# Patient Record
Sex: MALE | Race: WHITE | NOT HISPANIC OR LATINO | Employment: FULL TIME | URBAN - METROPOLITAN AREA
[De-identification: names, ages, dates, MRNs, and addresses within clinical notes are randomized per-mention and may not be internally consistent; named-entity substitution may affect disease eponyms.]

---

## 2017-08-15 ENCOUNTER — TELEPHONE (OUTPATIENT)
Dept: SPINE | Facility: CLINIC | Age: 42
End: 2017-08-15

## 2017-08-15 DIAGNOSIS — M54.50 LUMBAR SPINE PAIN: Primary | ICD-10-CM

## 2017-08-18 ENCOUNTER — TELEPHONE (OUTPATIENT)
Dept: ORTHOPEDICS | Facility: CLINIC | Age: 42
End: 2017-08-18

## 2017-08-18 NOTE — TELEPHONE ENCOUNTER
Called patient several times and both numbers in chart are invalid. Patient is not active on [facility].

## 2017-10-19 DIAGNOSIS — H54.62 VISION LOSS OF LEFT EYE: Primary | ICD-10-CM

## 2017-10-27 ENCOUNTER — HOSPITAL ENCOUNTER (OUTPATIENT)
Dept: RADIOLOGY | Facility: HOSPITAL | Age: 42
Discharge: HOME | End: 2017-10-27
Attending: INTERNAL MEDICINE
Payer: COMMERCIAL

## 2017-10-27 DIAGNOSIS — H54.62 VISION LOSS OF LEFT EYE: ICD-10-CM

## 2017-10-27 PROCEDURE — 93880 EXTRACRANIAL BILAT STUDY: CPT | Mod: TC

## 2017-10-27 PROCEDURE — 93880 EXTRACRANIAL BILAT STUDY: CPT | Mod: 26,,, | Performed by: RADIOLOGY

## 2017-11-02 DIAGNOSIS — H54.62 VISION LOSS OF LEFT EYE: Primary | ICD-10-CM

## 2017-11-14 ENCOUNTER — HOSPITAL ENCOUNTER (OUTPATIENT)
Dept: RADIOLOGY | Facility: HOSPITAL | Age: 42
Discharge: HOME | End: 2017-11-14
Attending: INTERNAL MEDICINE
Payer: COMMERCIAL

## 2017-11-14 DIAGNOSIS — H54.62 VISION LOSS OF LEFT EYE: ICD-10-CM

## 2017-11-14 PROCEDURE — 70553 MRI BRAIN STEM W/O & W/DYE: CPT | Mod: 26,,, | Performed by: RADIOLOGY

## 2017-11-14 PROCEDURE — 70553 MRI BRAIN STEM W/O & W/DYE: CPT | Mod: TC

## 2017-11-14 PROCEDURE — A9585 GADOBUTROL INJECTION: HCPCS | Performed by: INTERNAL MEDICINE

## 2017-11-14 PROCEDURE — 25500020 PHARM REV CODE 255: Performed by: INTERNAL MEDICINE

## 2017-11-14 RX ORDER — GADOBUTROL 604.72 MG/ML
10 INJECTION INTRAVENOUS
Status: COMPLETED | OUTPATIENT
Start: 2017-11-14 | End: 2017-11-14

## 2017-11-14 RX ADMIN — GADOBUTROL 10 ML: 604.72 INJECTION INTRAVENOUS at 09:11

## 2019-03-20 ENCOUNTER — HOSPITAL ENCOUNTER (OUTPATIENT)
Dept: RADIOLOGY | Facility: HOSPITAL | Age: 44
Discharge: HOME | End: 2019-03-20
Attending: INTERNAL MEDICINE
Payer: COMMERCIAL

## 2019-03-20 DIAGNOSIS — M53.3 COCCYDYNIA: Primary | ICD-10-CM

## 2019-03-20 DIAGNOSIS — M53.3 COCCYDYNIA: ICD-10-CM

## 2019-03-20 PROCEDURE — 72100 X-RAY EXAM L-S SPINE 2/3 VWS: CPT | Mod: TC,FY

## 2019-03-20 PROCEDURE — 72100 X-RAY EXAM L-S SPINE 2/3 VWS: CPT | Mod: 26,,, | Performed by: RADIOLOGY

## 2019-03-20 PROCEDURE — 72100 XR LUMBAR SPINE AP AND LATERAL: ICD-10-PCS | Mod: 26,,, | Performed by: RADIOLOGY

## 2019-03-20 PROCEDURE — 72220 XR SACRUM AND COCCYX: ICD-10-PCS | Mod: 26,,, | Performed by: RADIOLOGY

## 2019-03-20 PROCEDURE — 72220 X-RAY EXAM SACRUM TAILBONE: CPT | Mod: 26,,, | Performed by: RADIOLOGY

## 2019-03-20 PROCEDURE — 72220 X-RAY EXAM SACRUM TAILBONE: CPT | Mod: TC,FY

## 2019-07-31 ENCOUNTER — OFFICE VISIT (OUTPATIENT)
Dept: URGENT CARE | Facility: CLINIC | Age: 44
End: 2019-07-31
Payer: COMMERCIAL

## 2019-07-31 VITALS
SYSTOLIC BLOOD PRESSURE: 132 MMHG | WEIGHT: 265 LBS | TEMPERATURE: 97 F | HEIGHT: 74 IN | DIASTOLIC BLOOD PRESSURE: 88 MMHG | OXYGEN SATURATION: 97 % | HEART RATE: 80 BPM | BODY MASS INDEX: 34.01 KG/M2

## 2019-07-31 DIAGNOSIS — J32.9 SINUSITIS, UNSPECIFIED CHRONICITY, UNSPECIFIED LOCATION: Primary | ICD-10-CM

## 2019-07-31 LAB
CTP QC/QA: YES
S PYO RRNA THROAT QL PROBE: NEGATIVE

## 2019-07-31 PROCEDURE — 96372 PR INJECTION,THERAP/PROPH/DIAG2ST, IM OR SUBCUT: ICD-10-PCS | Mod: S$GLB,,, | Performed by: NURSE PRACTITIONER

## 2019-07-31 PROCEDURE — 87880 POCT RAPID STREP A: ICD-10-PCS | Mod: QW,S$GLB,, | Performed by: NURSE PRACTITIONER

## 2019-07-31 PROCEDURE — 3008F PR BODY MASS INDEX (BMI) DOCUMENTED: ICD-10-PCS | Mod: CPTII,S$GLB,, | Performed by: NURSE PRACTITIONER

## 2019-07-31 PROCEDURE — 87880 STREP A ASSAY W/OPTIC: CPT | Mod: QW,S$GLB,, | Performed by: NURSE PRACTITIONER

## 2019-07-31 PROCEDURE — 96372 THER/PROPH/DIAG INJ SC/IM: CPT | Mod: S$GLB,,, | Performed by: NURSE PRACTITIONER

## 2019-07-31 PROCEDURE — 99203 PR OFFICE/OUTPT VISIT, NEW, LEVL III, 30-44 MIN: ICD-10-PCS | Mod: 25,S$GLB,, | Performed by: NURSE PRACTITIONER

## 2019-07-31 PROCEDURE — 99203 OFFICE O/P NEW LOW 30 MIN: CPT | Mod: 25,S$GLB,, | Performed by: NURSE PRACTITIONER

## 2019-07-31 PROCEDURE — 3008F BODY MASS INDEX DOCD: CPT | Mod: CPTII,S$GLB,, | Performed by: NURSE PRACTITIONER

## 2019-07-31 RX ORDER — AMOXICILLIN AND CLAVULANATE POTASSIUM 875; 125 MG/1; MG/1
1 TABLET, FILM COATED ORAL 2 TIMES DAILY
Qty: 14 TABLET | Refills: 0 | Status: SHIPPED | OUTPATIENT
Start: 2019-07-31 | End: 2019-08-07

## 2019-07-31 RX ORDER — HYDROCODONE BITARTRATE AND ACETAMINOPHEN 5; 325 MG/1; MG/1
TABLET ORAL
COMMUNITY
End: 2021-10-14

## 2019-07-31 RX ORDER — LISINOPRIL 40 MG/1
TABLET ORAL
COMMUNITY
End: 2020-11-20

## 2019-07-31 RX ORDER — DICLOFENAC SODIUM 75 MG/1
TABLET, DELAYED RELEASE ORAL
COMMUNITY
End: 2021-10-14

## 2019-07-31 RX ORDER — DEXAMETHASONE SODIUM PHOSPHATE 100 MG/10ML
6 INJECTION INTRAMUSCULAR; INTRAVENOUS
Status: COMPLETED | OUTPATIENT
Start: 2019-07-31 | End: 2019-07-31

## 2019-07-31 RX ADMIN — DEXAMETHASONE SODIUM PHOSPHATE 6 MG: 100 INJECTION INTRAMUSCULAR; INTRAVENOUS at 12:07

## 2019-07-31 NOTE — PATIENT INSTRUCTIONS
"Please follow up with your Primary care provider within 2-5 days if your signs and symptoms have not resolved or worsen.  The usual course of cold symptoms are 10-14 days.     If your condition worsens or fails to improve we recommend that you receive another evaluation at the emergency room immediately or contact your primary medical clinic to discuss your concerns.     You must understand that you have received an Urgent Care treatment only and that you may be released before all of your medical problems are known or treated.   You, the patient, will arrange for follow up care as instructed.     Tylenol or Ibuprofen can also be used as directed for pain/fever unless you have an allergy to them or medical condition such as stomach ulcers, kidney or liver disease or blood thinners etc for which you should not be taking these type of medications.     Take over the counter cough medication as directed as needed for cough.  You should avoid medications with pseudoephedrine or phenylephrine (any medication with "D") if you have high blood pressure as this can cause an elevation in your blood pressure. Instead consider Corcidin HBP as needed to prevent an elevated blood pressure.     Natural remedies of symptoms (as needed) include humidification, saline nasal sprays, and/or steamy showers.  Increase fluids, warm tea with honey, cough drops as needed.  You may also use salt water gargles for sore throat.    IF you received a steroid shot today - As discussed, this can elevate your blood pressure, elevate your blood sugar, water weight gain, nervous energy, redness to the face and dimpling of the skin at the injection site.       You have been given Augmentin for an infection today.  Please take all the antibiotic as prescribed on the bottle.      Augmentin is hard on the stomach and should always be taken with food.      Augmentin can cause diarrhea.  As with any antibiotics, use probiotics and/or high culture yogurt " about 2 hours apart from the antibiotic and about 1 week after the antibiotic to replace the gut alexandr lost with antibiotic use.      If you are female and on BCP use additional methods to prevent pregnancy while on antibiotics and for one cycle after.       Sinusitis (Antibiotic Treatment)    The sinuses are air-filled spaces within the bones of the face. They connect to the inside of the nose. Sinusitis is an inflammation of the tissue lining the sinus cavity. Sinus inflammation can occur during a cold. It can also be due to allergies to pollens and other particles in the air. Sinusitis can cause symptoms of sinus congestion and fullness. A sinus infection causes fever, headache and facial pain. There is often green or yellow drainage from the nose or into the back of the throat (post-nasal drip). You have been given antibiotics to treat this condition.  Home care:  · Take the full course of antibiotics as instructed. Do not stop taking them, even if you feel better.  · Drink plenty of water, hot tea, and other liquids. This may help thin mucus. It also may promote sinus drainage.  · Heat may help soothe painful areas of the face. Use a towel soaked in hot water. Or,  the shower and direct the hot spray onto your face. Using a vaporizer along with a menthol rub at night may also help.   · An expectorant containing guaifenesin may help thin the mucus and promote drainage from the sinuses.  · Over-the-counter decongestants may be used unless a similar medicine was prescribed. Nasal sprays work the fastest. Use one that contains phenylephrine or oxymetazoline. First blow the nose gently. Then use the spray. Do not use these medicines more often than directed on the label or symptoms may get worse. You may also use tablets containing pseudoephedrine. Avoid products that combine ingredients, because side effects may be increased. Read labels. You can also ask the pharmacist for help. (NOTE: Persons with high  blood pressure should not use decongestants. They can raise blood pressure.)  · Over-the-counter antihistamines may help if allergies contributed to your sinusitis.    · Do not use nasal rinses or irrigation during an acute sinus infection, unless told to by your health care provider. Rinsing may spread the infection to other sinuses.  · Use acetaminophen or ibuprofen to control pain, unless another pain medicine was prescribed. (If you have chronic liver or kidney disease or ever had a stomach ulcer, talk with your doctor before using these medicines. Aspirin should never be used in anyone under 18 years of age who is ill with a fever. It may cause severe liver damage.)  · Don't smoke. This can worsen symptoms.  Follow-up care  Follow up with your healthcare provider or our staff if you are not improving within the next week.  When to seek medical advice  Call your healthcare provider if any of these occur:  · Facial pain or headache becoming more severe  · Stiff neck  · Unusual drowsiness or confusion  · Swelling of the forehead or eyelids  · Vision problems, including blurred or double vision  · Fever of 100.4ºF (38ºC) or higher, or as directed by your healthcare provider  · Seizure  · Breathing problems  · Symptoms not resolving within 10 days  Date Last Reviewed: 4/13/2015  © 7464-9649 [employer]. [address] [place] [address], [place] [address]. All rights reserved. This information is not intended as a substitute for professional medical care. Always follow your healthcare professional's instructions.

## 2019-07-31 NOTE — PROGRESS NOTES
"Subjective:       Patient ID: Jean Martinez is a 44 y.o. male.    Vitals:  height is 6' 2" (1.88 m) and weight is 120.2 kg (265 lb). His oral temperature is 97.3 °F (36.3 °C). His blood pressure is 132/88 and his pulse is 80. His oxygen saturation is 97%.     Chief Complaint: Sore Throat    Patient presents today with sore throat ,painful swallowing and congestion . Patient denies yellow/ green sputum and not sure if he has had fever . Onset of symptoms four days ago.     Sore Throat    This is a new problem. The current episode started in the past 7 days. The problem has been unchanged. Neither side of throat is experiencing more pain than the other. There has been no fever. The pain is at a severity of 7/10. The pain is moderate. Associated symptoms include congestion and trouble swallowing. Pertinent negatives include no abdominal pain, coughing, diarrhea, drooling, ear discharge, ear pain, headaches, hoarse voice, plugged ear sensation, neck pain, shortness of breath, stridor, swollen glands or vomiting. Treatments tried: theraflu  The treatment provided no relief.       HENT: Positive for congestion, sore throat, trouble swallowing and voice change. Negative for ear pain, ear discharge and drooling.    Neck: Negative for neck pain.   Respiratory: Negative for cough, shortness of breath and stridor.    Gastrointestinal: Negative for abdominal pain, vomiting and diarrhea.   Neurological: Negative for headaches.       Objective:      Physical Exam   Constitutional: He appears well-developed and well-nourished. He is cooperative.  Non-toxic appearance. He does not appear ill. No distress.   HENT:   Head: Normocephalic and atraumatic.   Right Ear: Hearing, tympanic membrane, external ear and ear canal normal.   Left Ear: Hearing, tympanic membrane, external ear and ear canal normal.   Nose: Mucosal edema and rhinorrhea present. No nasal deformity. No epistaxis. Right sinus exhibits no maxillary sinus tenderness and no " frontal sinus tenderness. Left sinus exhibits no maxillary sinus tenderness and no frontal sinus tenderness.   Mouth/Throat: Uvula is midline and mucous membranes are normal. No trismus in the jaw. Normal dentition. No uvula swelling. Posterior oropharyngeal erythema present. No oropharyngeal exudate or posterior oropharyngeal edema.   Eyes: Conjunctivae and lids are normal. No scleral icterus.   Neck: Trachea normal, full passive range of motion without pain and phonation normal. Neck supple.   Cardiovascular: Normal rate, regular rhythm, normal heart sounds, intact distal pulses and normal pulses.   Pulmonary/Chest: Effort normal and breath sounds normal. No accessory muscle usage or stridor. No tachypnea. No respiratory distress. He has no decreased breath sounds. He has no wheezes. He has no rhonchi. He has no rales.   Musculoskeletal: Normal range of motion. He exhibits no edema or deformity.   Lymphadenopathy:     He has no cervical adenopathy.        Right cervical: No superficial cervical, no deep cervical and no posterior cervical adenopathy present.       Left cervical: No superficial cervical, no deep cervical and no posterior cervical adenopathy present.   Neurological: He is alert. He is not disoriented. He exhibits normal muscle tone. Coordination normal.   Skin: Skin is warm, dry and intact. He is not diaphoretic. No pallor.   Psychiatric: He has a normal mood and affect. His speech is normal and behavior is normal. Judgment and thought content normal. Cognition and memory are normal.   Nursing note and vitals reviewed.      Assessment:       1. Sinusitis, unspecified chronicity, unspecified location        Plan:     Results for orders placed or performed in visit on 07/31/19   POCT rapid strep A   Result Value Ref Range    Rapid Strep A Screen Negative Negative     Acceptable Yes        Sinusitis, unspecified chronicity, unspecified location  -     POCT rapid strep A  -      "dexamethasone injection 6 mg  -     amoxicillin-clavulanate 875-125mg (AUGMENTIN) 875-125 mg per tablet; Take 1 tablet by mouth 2 (two) times daily. for 7 days  Dispense: 14 tablet; Refill: 0      Patient Instructions   Please follow up with your Primary care provider within 2-5 days if your signs and symptoms have not resolved or worsen.  The usual course of cold symptoms are 10-14 days.     If your condition worsens or fails to improve we recommend that you receive another evaluation at the emergency room immediately or contact your primary medical clinic to discuss your concerns.     You must understand that you have received an Urgent Care treatment only and that you may be released before all of your medical problems are known or treated.   You, the patient, will arrange for follow up care as instructed.     Tylenol or Ibuprofen can also be used as directed for pain/fever unless you have an allergy to them or medical condition such as stomach ulcers, kidney or liver disease or blood thinners etc for which you should not be taking these type of medications.     Take over the counter cough medication as directed as needed for cough.  You should avoid medications with pseudoephedrine or phenylephrine (any medication with "D") if you have high blood pressure as this can cause an elevation in your blood pressure. Instead consider Corcidin HBP as needed to prevent an elevated blood pressure.     Natural remedies of symptoms (as needed) include humidification, saline nasal sprays, and/or steamy showers.  Increase fluids, warm tea with honey, cough drops as needed.  You may also use salt water gargles for sore throat.    IF you received a steroid shot today - As discussed, this can elevate your blood pressure, elevate your blood sugar, water weight gain, nervous energy, redness to the face and dimpling of the skin at the injection site.       You have been given Augmentin for an infection today.  Please take all the " antibiotic as prescribed on the bottle.      Augmentin is hard on the stomach and should always be taken with food.      Augmentin can cause diarrhea.  As with any antibiotics, use probiotics and/or high culture yogurt about 2 hours apart from the antibiotic and about 1 week after the antibiotic to replace the gut alexandr lost with antibiotic use.      If you are female and on BCP use additional methods to prevent pregnancy while on antibiotics and for one cycle after.       Sinusitis (Antibiotic Treatment)    The sinuses are air-filled spaces within the bones of the face. They connect to the inside of the nose. Sinusitis is an inflammation of the tissue lining the sinus cavity. Sinus inflammation can occur during a cold. It can also be due to allergies to pollens and other particles in the air. Sinusitis can cause symptoms of sinus congestion and fullness. A sinus infection causes fever, headache and facial pain. There is often green or yellow drainage from the nose or into the back of the throat (post-nasal drip). You have been given antibiotics to treat this condition.  Home care:  · Take the full course of antibiotics as instructed. Do not stop taking them, even if you feel better.  · Drink plenty of water, hot tea, and other liquids. This may help thin mucus. It also may promote sinus drainage.  · Heat may help soothe painful areas of the face. Use a towel soaked in hot water. Or,  the shower and direct the hot spray onto your face. Using a vaporizer along with a menthol rub at night may also help.   · An expectorant containing guaifenesin may help thin the mucus and promote drainage from the sinuses.  · Over-the-counter decongestants may be used unless a similar medicine was prescribed. Nasal sprays work the fastest. Use one that contains phenylephrine or oxymetazoline. First blow the nose gently. Then use the spray. Do not use these medicines more often than directed on the label or symptoms may get  worse. You may also use tablets containing pseudoephedrine. Avoid products that combine ingredients, because side effects may be increased. Read labels. You can also ask the pharmacist for help. (NOTE: Persons with high blood pressure should not use decongestants. They can raise blood pressure.)  · Over-the-counter antihistamines may help if allergies contributed to your sinusitis.    · Do not use nasal rinses or irrigation during an acute sinus infection, unless told to by your health care provider. Rinsing may spread the infection to other sinuses.  · Use acetaminophen or ibuprofen to control pain, unless another pain medicine was prescribed. (If you have chronic liver or kidney disease or ever had a stomach ulcer, talk with your doctor before using these medicines. Aspirin should never be used in anyone under 18 years of age who is ill with a fever. It may cause severe liver damage.)  · Don't smoke. This can worsen symptoms.  Follow-up care  Follow up with your healthcare provider or our staff if you are not improving within the next week.  When to seek medical advice  Call your healthcare provider if any of these occur:  · Facial pain or headache becoming more severe  · Stiff neck  · Unusual drowsiness or confusion  · Swelling of the forehead or eyelids  · Vision problems, including blurred or double vision  · Fever of 100.4ºF (38ºC) or higher, or as directed by your healthcare provider  · Seizure  · Breathing problems  · Symptoms not resolving within 10 days  Date Last Reviewed: 4/13/2015  © 2605-7021 [employer]. [address] [place] [address], [place] [address]. All rights reserved. This information is not intended as a substitute for professional medical care. Always follow your healthcare professional's instructions.

## 2019-08-03 ENCOUNTER — TELEPHONE (OUTPATIENT)
Dept: URGENT CARE | Facility: CLINIC | Age: 44
End: 2019-08-03

## 2021-03-08 ENCOUNTER — OFFICE VISIT (OUTPATIENT)
Dept: URGENT CARE | Facility: CLINIC | Age: 46
End: 2021-03-08
Payer: COMMERCIAL

## 2021-03-08 VITALS
WEIGHT: 265 LBS | BODY MASS INDEX: 34.01 KG/M2 | OXYGEN SATURATION: 97 % | SYSTOLIC BLOOD PRESSURE: 163 MMHG | HEIGHT: 74 IN | DIASTOLIC BLOOD PRESSURE: 106 MMHG | TEMPERATURE: 98 F | HEART RATE: 99 BPM

## 2021-03-08 DIAGNOSIS — Z11.52 ENCOUNTER FOR SCREENING FOR COVID-19: ICD-10-CM

## 2021-03-08 DIAGNOSIS — J00 ACUTE NASOPHARYNGITIS: Primary | ICD-10-CM

## 2021-03-08 DIAGNOSIS — E66.9 OBESITY (BMI 30-39.9): ICD-10-CM

## 2021-03-08 DIAGNOSIS — R09.81 COUGH WITH CONGESTION OF PARANASAL SINUS: ICD-10-CM

## 2021-03-08 DIAGNOSIS — R09.82 PND (POST-NASAL DRIP): ICD-10-CM

## 2021-03-08 DIAGNOSIS — R05.8 COUGH WITH CONGESTION OF PARANASAL SINUS: ICD-10-CM

## 2021-03-08 LAB
CTP QC/QA: YES
SARS-COV-2 RDRP RESP QL NAA+PROBE: NEGATIVE

## 2021-03-08 PROCEDURE — 1126F AMNT PAIN NOTED NONE PRSNT: CPT | Mod: S$GLB,,, | Performed by: PHYSICIAN ASSISTANT

## 2021-03-08 PROCEDURE — U0002 COVID-19 LAB TEST NON-CDC: HCPCS | Mod: QW,S$GLB,, | Performed by: PHYSICIAN ASSISTANT

## 2021-03-08 PROCEDURE — U0002: ICD-10-PCS | Mod: QW,S$GLB,, | Performed by: PHYSICIAN ASSISTANT

## 2021-03-08 PROCEDURE — 3008F BODY MASS INDEX DOCD: CPT | Mod: CPTII,S$GLB,, | Performed by: PHYSICIAN ASSISTANT

## 2021-03-08 PROCEDURE — 99214 PR OFFICE/OUTPT VISIT, EST, LEVL IV, 30-39 MIN: ICD-10-PCS | Mod: S$GLB,CS,, | Performed by: PHYSICIAN ASSISTANT

## 2021-03-08 PROCEDURE — 1126F PR PAIN SEVERITY QUANTIFIED, NO PAIN PRESENT: ICD-10-PCS | Mod: S$GLB,,, | Performed by: PHYSICIAN ASSISTANT

## 2021-03-08 PROCEDURE — 99214 OFFICE O/P EST MOD 30 MIN: CPT | Mod: S$GLB,CS,, | Performed by: PHYSICIAN ASSISTANT

## 2021-03-08 PROCEDURE — 3008F PR BODY MASS INDEX (BMI) DOCUMENTED: ICD-10-PCS | Mod: CPTII,S$GLB,, | Performed by: PHYSICIAN ASSISTANT

## 2021-03-08 RX ORDER — AZELASTINE 1 MG/ML
1 SPRAY, METERED NASAL 2 TIMES DAILY PRN
Qty: 30 ML | Refills: 0 | Status: SHIPPED | OUTPATIENT
Start: 2021-03-08 | End: 2021-10-14

## 2021-03-08 RX ORDER — BENZONATATE 200 MG/1
200 CAPSULE ORAL 3 TIMES DAILY PRN
Qty: 30 CAPSULE | Refills: 0 | Status: SHIPPED | OUTPATIENT
Start: 2021-03-08 | End: 2021-03-18

## 2021-03-08 RX ORDER — PROMETHAZINE HYDROCHLORIDE AND DEXTROMETHORPHAN HYDROBROMIDE 6.25; 15 MG/5ML; MG/5ML
5 SYRUP ORAL NIGHTLY PRN
Qty: 118 ML | Refills: 0 | Status: SHIPPED | OUTPATIENT
Start: 2021-03-08 | End: 2021-03-18

## 2021-03-08 RX ORDER — METHYLPREDNISOLONE 4 MG/1
TABLET ORAL
COMMUNITY
End: 2021-03-29 | Stop reason: SDUPTHER

## 2021-03-08 RX ORDER — LISINOPRIL 40 MG/1
TABLET ORAL
COMMUNITY
End: 2021-03-29 | Stop reason: SDUPTHER

## 2021-03-29 PROBLEM — Z83.3 FAMILY HISTORY OF DIABETES MELLITUS: Status: ACTIVE | Noted: 2017-10-19

## 2021-03-29 PROBLEM — M41.80 DEXTROSCOLIOSIS: Status: ACTIVE | Noted: 2019-03-29

## 2021-03-29 PROBLEM — M47.816 LUMBAR SPONDYLOSIS: Status: ACTIVE | Noted: 2019-03-29

## 2021-03-29 PROBLEM — M53.3 COCCYDYNIA: Status: ACTIVE | Noted: 2019-03-20

## 2022-04-29 ENCOUNTER — OCCUPATIONAL HEALTH (OUTPATIENT)
Dept: URGENT CARE | Facility: CLINIC | Age: 47
End: 2022-04-29
Payer: COMMERCIAL

## 2022-04-29 DIAGNOSIS — Z11.1 VISIT FOR TB SKIN TEST: Primary | ICD-10-CM

## 2022-04-29 PROCEDURE — 86580 POCT TB SKIN TEST: ICD-10-PCS | Mod: S$GLB,,, | Performed by: FAMILY MEDICINE

## 2022-04-29 PROCEDURE — 86580 TB INTRADERMAL TEST: CPT | Mod: S$GLB,,, | Performed by: FAMILY MEDICINE

## 2022-04-29 NOTE — PROGRESS NOTES
Subjective:       Patient ID: Jean Martinez is a 47 y.o. male.    Vitals:  vitals were not taken for this visit.     Chief Complaint: No chief complaint on file.    HPI  ROS    Objective:      Physical Exam      Assessment:       No diagnosis found.      Plan:         There are no diagnoses linked to this encounter.

## 2022-05-01 LAB
TB INDURATION - 48 HR READ: 0 MM
TB INDURATION - 72 HR READ: NORMAL
TB SKIN TEST - 48 HR READ: NEGATIVE
TB SKIN TEST - 72 HR READ: NORMAL

## 2022-05-02 NOTE — PROGRESS NOTES
Subjective:       Patient ID: Jean Martinez is a 47 y.o. male.    Chief Complaint: No chief complaint on file.    HPI  ROS     Objective:      Physical Exam    Assessment:       1. Visit for TB skin test        Plan:                   No follow-ups on file.

## 2022-06-08 DIAGNOSIS — G89.29 CHRONIC LOW BACK PAIN: Primary | ICD-10-CM

## 2022-06-08 DIAGNOSIS — M54.50 CHRONIC LOW BACK PAIN: Primary | ICD-10-CM

## 2022-06-09 ENCOUNTER — TELEPHONE (OUTPATIENT)
Dept: SPORTS MEDICINE | Facility: CLINIC | Age: 47
End: 2022-06-09

## 2022-06-09 ENCOUNTER — HOSPITAL ENCOUNTER (OUTPATIENT)
Dept: RADIOLOGY | Facility: HOSPITAL | Age: 47
Discharge: HOME | End: 2022-06-09
Attending: NEUROMUSCULOSKELETAL MEDICINE & OMM
Payer: COMMERCIAL

## 2022-06-09 ENCOUNTER — OFFICE VISIT (OUTPATIENT)
Dept: SPORTS MEDICINE | Facility: CLINIC | Age: 47
End: 2022-06-09
Payer: COMMERCIAL

## 2022-06-09 VITALS
SYSTOLIC BLOOD PRESSURE: 140 MMHG | BODY MASS INDEX: 33.88 KG/M2 | WEIGHT: 264 LBS | HEIGHT: 74 IN | DIASTOLIC BLOOD PRESSURE: 90 MMHG

## 2022-06-09 DIAGNOSIS — M99.05 SOMATIC DYSFUNCTION OF PELVIC REGION: ICD-10-CM

## 2022-06-09 DIAGNOSIS — M47.816 LUMBAR SPONDYLOSIS: ICD-10-CM

## 2022-06-09 DIAGNOSIS — M54.9 DORSALGIA, UNSPECIFIED: ICD-10-CM

## 2022-06-09 DIAGNOSIS — M54.41 CHRONIC BILATERAL LOW BACK PAIN WITH BILATERAL SCIATICA: Primary | ICD-10-CM

## 2022-06-09 DIAGNOSIS — M54.42 CHRONIC BILATERAL LOW BACK PAIN WITH BILATERAL SCIATICA: Primary | ICD-10-CM

## 2022-06-09 DIAGNOSIS — M79.10 MYALGIA: ICD-10-CM

## 2022-06-09 DIAGNOSIS — M54.50 CHRONIC LOW BACK PAIN: ICD-10-CM

## 2022-06-09 DIAGNOSIS — M99.03 SOMATIC DYSFUNCTION OF LUMBAR REGION: ICD-10-CM

## 2022-06-09 DIAGNOSIS — M51.36 DDD (DEGENERATIVE DISC DISEASE), LUMBAR: ICD-10-CM

## 2022-06-09 DIAGNOSIS — G89.29 CHRONIC BILATERAL LOW BACK PAIN WITH BILATERAL SCIATICA: Primary | ICD-10-CM

## 2022-06-09 DIAGNOSIS — G89.29 CHRONIC LOW BACK PAIN: ICD-10-CM

## 2022-06-09 DIAGNOSIS — M99.04 SACRAL REGION SOMATIC DYSFUNCTION: ICD-10-CM

## 2022-06-09 PROCEDURE — 1159F PR MEDICATION LIST DOCUMENTED IN MEDICAL RECORD: ICD-10-PCS | Mod: CPTII,S$GLB,, | Performed by: NEUROMUSCULOSKELETAL MEDICINE & OMM

## 2022-06-09 PROCEDURE — 72114 XR LUMBAR SPINE 5 VIEW WITH FLEX AND EXT: ICD-10-PCS | Mod: 26,,, | Performed by: RADIOLOGY

## 2022-06-09 PROCEDURE — 97110 THERAPEUTIC EXERCISES: CPT | Mod: S$GLB,,, | Performed by: NEUROMUSCULOSKELETAL MEDICINE & OMM

## 2022-06-09 PROCEDURE — 4010F PR ACE/ARB THEARPY RXD/TAKEN: ICD-10-PCS | Mod: CPTII,S$GLB,, | Performed by: NEUROMUSCULOSKELETAL MEDICINE & OMM

## 2022-06-09 PROCEDURE — 3008F PR BODY MASS INDEX (BMI) DOCUMENTED: ICD-10-PCS | Mod: CPTII,S$GLB,, | Performed by: NEUROMUSCULOSKELETAL MEDICINE & OMM

## 2022-06-09 PROCEDURE — 98926 OSTEOPATH MANJ 3-4 REGIONS: CPT | Mod: S$GLB,,, | Performed by: NEUROMUSCULOSKELETAL MEDICINE & OMM

## 2022-06-09 PROCEDURE — 99204 PR OFFICE/OUTPT VISIT, NEW, LEVL IV, 45-59 MIN: ICD-10-PCS | Mod: 25,S$GLB,, | Performed by: NEUROMUSCULOSKELETAL MEDICINE & OMM

## 2022-06-09 PROCEDURE — 4010F ACE/ARB THERAPY RXD/TAKEN: CPT | Mod: CPTII,S$GLB,, | Performed by: NEUROMUSCULOSKELETAL MEDICINE & OMM

## 2022-06-09 PROCEDURE — 97110 PR THERAPEUTIC EXERCISES: ICD-10-PCS | Mod: S$GLB,,, | Performed by: NEUROMUSCULOSKELETAL MEDICINE & OMM

## 2022-06-09 PROCEDURE — 1159F MED LIST DOCD IN RCRD: CPT | Mod: CPTII,S$GLB,, | Performed by: NEUROMUSCULOSKELETAL MEDICINE & OMM

## 2022-06-09 PROCEDURE — 3080F PR MOST RECENT DIASTOLIC BLOOD PRESSURE >= 90 MM HG: ICD-10-PCS | Mod: CPTII,S$GLB,, | Performed by: NEUROMUSCULOSKELETAL MEDICINE & OMM

## 2022-06-09 PROCEDURE — 1160F PR REVIEW ALL MEDS BY PRESCRIBER/CLIN PHARMACIST DOCUMENTED: ICD-10-PCS | Mod: CPTII,S$GLB,, | Performed by: NEUROMUSCULOSKELETAL MEDICINE & OMM

## 2022-06-09 PROCEDURE — 72114 X-RAY EXAM L-S SPINE BENDING: CPT | Mod: 26,,, | Performed by: RADIOLOGY

## 2022-06-09 PROCEDURE — 99204 OFFICE O/P NEW MOD 45 MIN: CPT | Mod: 25,S$GLB,, | Performed by: NEUROMUSCULOSKELETAL MEDICINE & OMM

## 2022-06-09 PROCEDURE — 3077F SYST BP >= 140 MM HG: CPT | Mod: CPTII,S$GLB,, | Performed by: NEUROMUSCULOSKELETAL MEDICINE & OMM

## 2022-06-09 PROCEDURE — 98926 PR OSTEOPATHIC MANIP,3-4 BODY REGN: ICD-10-PCS | Mod: S$GLB,,, | Performed by: NEUROMUSCULOSKELETAL MEDICINE & OMM

## 2022-06-09 PROCEDURE — 99999 PR PBB SHADOW E&M-EST. PATIENT-LVL III: ICD-10-PCS | Mod: PBBFAC,,, | Performed by: NEUROMUSCULOSKELETAL MEDICINE & OMM

## 2022-06-09 PROCEDURE — 72114 X-RAY EXAM L-S SPINE BENDING: CPT | Mod: TC,PO

## 2022-06-09 PROCEDURE — 3077F PR MOST RECENT SYSTOLIC BLOOD PRESSURE >= 140 MM HG: ICD-10-PCS | Mod: CPTII,S$GLB,, | Performed by: NEUROMUSCULOSKELETAL MEDICINE & OMM

## 2022-06-09 PROCEDURE — 3080F DIAST BP >= 90 MM HG: CPT | Mod: CPTII,S$GLB,, | Performed by: NEUROMUSCULOSKELETAL MEDICINE & OMM

## 2022-06-09 PROCEDURE — 1160F RVW MEDS BY RX/DR IN RCRD: CPT | Mod: CPTII,S$GLB,, | Performed by: NEUROMUSCULOSKELETAL MEDICINE & OMM

## 2022-06-09 PROCEDURE — 99999 PR PBB SHADOW E&M-EST. PATIENT-LVL III: CPT | Mod: PBBFAC,,, | Performed by: NEUROMUSCULOSKELETAL MEDICINE & OMM

## 2022-06-09 PROCEDURE — 3008F BODY MASS INDEX DOCD: CPT | Mod: CPTII,S$GLB,, | Performed by: NEUROMUSCULOSKELETAL MEDICINE & OMM

## 2022-06-09 NOTE — TELEPHONE ENCOUNTER
----- Message from Gadiel Sharma sent at 6/9/2022  4:14 PM CDT -----  Regarding: Call  Contact: Patient/Spouse  Type: Patient Call Back    Who called:Patient    What is the request in detail: Patient is requesting a call back. SHE SPOKE WITH INSURANCE AND PEER TO PEER or authorization is needed. Please advise.    Can the clinic reply by [facility]? No    Would the patient rather a call back or a response via [facility]? Call    Best call back number: [phone]    Additional Information:    Thanks

## 2022-06-09 NOTE — PROGRESS NOTES
Subjective:     Jean Martinez    Chief Complaint   Patient presents with    Lower Back - Pain       HPI    Jean is coming in today for low back pain that began 10+ years ago. Pt. describes the pain as a 1/10 achy pain that does radiate down his left>right leg to his feet. He did reports some numbness in bilat legs that resolved about 2 weeks ago. There was not a fall/injury/ or trauma associated with the onset of symptoms. Pt reports he played multiple sports in high school and would get treatment before and after games for his back. The pain worsened in 2015. The pain is better with rest, prednisone and worse with activity. Pt sees a chiropractor with minimal relief- recently worsened his pain. Pt. Denies any other musculoskeletal complaints at this time.     Review of Systems   Constitutional: Negative for chills and fever.   HENT: Negative for hearing loss and tinnitus.    Eyes: Negative for blurred vision and photophobia.   Respiratory: Negative for cough and shortness of breath.    Cardiovascular: Negative for chest pain and leg swelling.   Gastrointestinal: Negative for abdominal pain, heartburn, nausea and vomiting.   Genitourinary: Negative for dysuria and hematuria.   Musculoskeletal: Positive for back pain and myalgias. Negative for falls, joint pain and neck pain.   Skin: Negative for rash.   Neurological: Negative for dizziness, tingling, focal weakness, weakness and headaches.   Endo/Heme/Allergies: Negative for environmental allergies. Does not bruise/bleed easily.   Psychiatric/Behavioral: Negative for depression. The patient is not nervous/anxious.      PAST MEDICAL HISTORY:   Past Medical History:   Diagnosis Date    Hypertension      PAST SURGICAL HISTORY:   Past Surgical History:   Procedure Laterality Date    KNEE SURGERY      SHOULDER SURGERY       FAMILY HISTORY:   Family History   Problem Relation Age of Onset    No Known Problems Mother     No Known Problems Father      SOCIAL HISTORY:  "  Social History     Socioeconomic History    Marital status:    Tobacco Use    Smoking status: Never Smoker    Smokeless tobacco: Never Used       MEDICATIONS:   Current Outpatient Medications:     valsartan (DIOVAN) 320 MG tablet, Take 1 tablet (320 mg total) by mouth once daily., Disp: 90 tablet, Rfl: 3    diclofenac (VOLTAREN) 75 MG EC tablet, Take 1 tablet (75 mg total) by mouth 2 (two) times daily. (Patient not taking: Reported on 6/9/2022), Disp: 20 tablet, Rfl: 0    lansoprazole (PREVACID) 30 MG capsule, Take 1 capsule (30 mg total) by mouth once daily., Disp: 30 capsule, Rfl: 5  ALLERGIES: Review of patient's allergies indicates:  No Known Allergies    Reviewed outside records from [facility] from 2015:  - MRI  images of lumbar spine taken 12/1/15 showed degenerative changes lumbar spine, with loss of disc height at L2-3 in L5-S1. Images personally reviewed.     Objective:     VITAL SIGNS: BP (!) 140/90   Ht 6' 2" (1.88 m)   Wt 119.7 kg (264 lb)   BMI 33.90 kg/m²     General    Vitals reviewed.  Constitutional: He is oriented to person, place, and time. He appears well-developed and well-nourished.   Neurological: He is alert and oriented to person, place, and time.   Psychiatric: He has a normal mood and affect. His behavior is normal.                 MUSCULOSKELETAL EXAM:     Lumbar Spine: left lumbar region    Observation:    Posture:  Posterior pelvis tilt with loss of lumbar lordosis  No obvious pelvic obliquity while standing.    No edema, erythema, or ecchymosis noted in lumbosacral region.    No midline skin abnormalities.    No atrophy of lower limb musculature.  Leg lengths symmetric following OMT to the pelvis.  Gait: Non-antalgic     Tenderness:  + tenderness throughout the lumbar spine, iliolumbar region, posterior pelvis.  No tenderness over the sacrum, piriformis, greater/lesser trochanters.  No bony deformities or step-offs palpated.     Range of " Motion:  Active flexion to 60°.  Active extension to 25°*.   Active rotation to 30° on left and 30° on right.  Active sidebending to 25° on left and 25° on right.   Passive hip flexion to 135° on left and 135° on right.    Passive hip internal rotation to 45° on left and 45° on right.   Passive hip external rotation to 45° on left and 45° on right.     Strength Testing (* = with pain):  Hip flexion - 5/5 on left and 5/5 on right  Hip extension - 5/5 on left and 5/5 on right  Knee flexion - 5/5 on left and 5/5 on right  Knee extension - 5/5 on left and 5/5 on right  Dorsiflexion - 5/5 on left and 5/5 on right  Plantarflexion - 5/5 on left and 5/5 on right  Great toe extension - 5/5 on left and 5/5 on right    Special Tests:    Seated straight leg raise - positive on left for dural tension and negative on right  Supine straight leg raise - positive on left and  right  For dural tension symptoms  Slump test - negative on left and negative on right  Provocation maneuvers exhibit no worsening of symptoms.  + tenderness with L5-S1 disc compression     DIDI test - negative  FADIR test - negative  Log roll test - negative    Structural Exam:  TART (Tissue texture abnormality, Asymmetry,  Restriction of motion and/or Tenderness) changes:     Lumbar Spine   L1 Neutral   L2 Neutral   L3 Neutral   L4 Neutral   L5 FRS RIGHT and left     Pelvis:  · Innominate:Neutral  · Pubic bone:Right inferior pubic shear     Sacrum:Left on Right sacral torsion    Key   F= Flexed   E = Extended   R = Rotated   S = Sidebent   TTA = tissue texture abnormality       Neurovascular Exam:  Reflexes +2/4 and symmetric at the L4 and S1 dermatomes.    Sensation intact to light touch in the L2-S2 dermatomes bilaterally.   No pretibial edema or abnormal hair pattern of the shin.    Intact and symmetric DP and PT pulses bilaterally.    IMAGIN. X-ray ordered due to low back pain. (AP, lateral, bilateral obliques, L5-S1 joint, flexion and extension  views) taken today.   2. X-ray images were reviewed personally by me and then directly with patient.  3. FINDINGS: The lumbar vertebra are intact.  AP view shows transitional configuration of S1 and slight curvature convex to left that may be positional.  No compression fracture is identified.  Mild disc space narrowing is seen at most levels.  Small osteophytes are noted throughout.  Sclerosis is present at lower facet joints.  L2-3 has a grade 1 retrolisthesis, stable between flexion and extension.  Visualized abdomen shows no stones or obvious masses.  Dense material is noted in the GI tract on the left, possibly ingested medication.  4. IMPRESSION: No acute abnormality.  Degenerative changes in lumbar spine with stable L2-3 grade 1 retrolisthesis.     Assessment:      Encounter Diagnoses   Name Primary?    Chronic bilateral low back pain with bilateral sciatica Yes    DDD (degenerative disc disease), lumbar     Lumbar spondylosis     Dorsalgia, unspecified     Myalgia     Somatic dysfunction of lumbar region     Sacral region somatic dysfunction     Somatic dysfunction of pelvic region           Plan:      1. Chronic low back pain with underlying lumbar spondylosis and DDD with symptoms most consistent with L5-S1 level pain and disc irritation, however no clear lumbar radicular symptoms appreciated on physical exam today.  Interval improvement of symptoms with recent Medrol Dosepak.  Last lumbar spine MRI was from 2015. Updated lumbar spine MRI ordered for further evaluation.   - OMT performed today to address associated biomechanical restrictions  and HEP started.   - discussed proper posture practices  - continue lumbar spine back brace as needed  - MRI  images of lumbar spine taken 12/1/15 showed degenerative changes lumbar spine,  with loss of disc height at L2-3 in L5-S1.  Images personally reviewed.  - X-ray images of lumbar spine taken today (AP, lateral, bilateral obliques, L5-S1 joint, flexion  and extension views) showed no acute abnormality.  Degenerative changes in lumbar spine with stable L2-3 grade 1 retrolisthesis.  Images were personally reviewed with patient.    2. OMT 3-4 regions. Oral consent obtained.  Reviewed benefits and potential side effects.   - OMT indicated today due to signs and symptoms as well as local and remote somatic dysfunction findings and their related neurokinetic, lymphatic, fascial and/or arteriovenous body connections.   - OMT techniques used: Myofascial Release and Mus[employer]   - Treatment was tolerated well. Improvement noted in segmental mobility post-treatment in dysfunctional regions. There were no adverse events and no complications immediately following treatment.     3. Pt. Given the following HEP:  A)  Pelvic clock exercises given to do from the 6-12 o'clock positions:10-15 reps, twice daily. Hand out of exercise also given.   B) Seated anterior pelvic tilt exercise: rotate pelvis forward to sit on ischial tuberosities while maintaining neutral shoulder positioning. Repeat frequently throughout the day.   C) Seated dural stretch exercise on Bilateral  While seated lift leg, extending knee and plantarflexing ankle until sensation of tension is appreciated, then immediately back off tension. Repeat exercise 15-20 reps, twice Daily. Handout also given.     [id number] HOME EXERCISE PROGRAM (HEP):  The patient was taught a homegoing physical therapy regimen as described above. The patient demonstrated understanding of the exercises and proper technique of their execution. This interaction took 15 minutes.     4. Follow-up in 2 weeks for reevaluation and review of lumbar spine MRI results    5. Patient agreeable to today's plan and all questions were answered      This note is dictated using the M*Modal Fluency Direct word recognition program. There are word recognition mistakes that are occasionally missed on review.

## 2022-06-20 ENCOUNTER — TELEPHONE (OUTPATIENT)
Dept: SPORTS MEDICINE | Facility: CLINIC | Age: 47
End: 2022-06-20
Payer: COMMERCIAL

## 2022-06-20 NOTE — TELEPHONE ENCOUNTER
Advised pt's fiancee that our pre service team is still working the referral and it's pending. Will complete peer to peer if needed. Esther states understanding and appreciation.     Annemarie Doll, MS, OTC  Clinical Assistant to Dr. Bree Westfall

## 2022-06-20 NOTE — TELEPHONE ENCOUNTER
----- Message from Brigida Conway sent at 6/20/2022 11:51 AM CDT -----  Type: Patient Call Back    Who called: Esther    What is the request in detail: Waiting to hear from Annemarie regarding MRI that needs approval.    Can the clinic reply by [facility]? no    Would the patient rather a call back or a response via [facility]? Call back    Best call back number: [phone]

## 2022-06-28 ENCOUNTER — HOSPITAL ENCOUNTER (OUTPATIENT)
Dept: RADIOLOGY | Facility: HOSPITAL | Age: 47
Discharge: HOME | End: 2022-06-28
Attending: NEUROMUSCULOSKELETAL MEDICINE & OMM
Payer: COMMERCIAL

## 2022-06-28 DIAGNOSIS — M54.9 DORSALGIA, UNSPECIFIED: ICD-10-CM

## 2022-06-28 DIAGNOSIS — M47.816 LUMBAR SPONDYLOSIS: ICD-10-CM

## 2022-06-28 DIAGNOSIS — G89.29 CHRONIC BILATERAL LOW BACK PAIN WITH BILATERAL SCIATICA: ICD-10-CM

## 2022-06-28 DIAGNOSIS — M54.42 CHRONIC BILATERAL LOW BACK PAIN WITH BILATERAL SCIATICA: ICD-10-CM

## 2022-06-28 DIAGNOSIS — M51.36 DDD (DEGENERATIVE DISC DISEASE), LUMBAR: ICD-10-CM

## 2022-06-28 DIAGNOSIS — M54.41 CHRONIC BILATERAL LOW BACK PAIN WITH BILATERAL SCIATICA: ICD-10-CM

## 2022-06-28 PROCEDURE — 72148 MRI LUMBAR SPINE W/O DYE: CPT | Mod: 26,,, | Performed by: RADIOLOGY

## 2022-06-28 PROCEDURE — 72148 MRI LUMBAR SPINE W/O DYE: CPT | Mod: TC

## 2022-06-28 PROCEDURE — 72148 MRI LUMBAR SPINE WITHOUT CONTRAST: ICD-10-PCS | Mod: 26,,, | Performed by: RADIOLOGY

## 2022-06-29 NOTE — PROGRESS NOTES
Subjective:     Jean Martinez    The patient location is: at work  The chief complaint leading to consultation is: F/u, MRI results    Visit type: audiovisual    Face to Face time with patient: 12  17 minutes of total time spent on the encounter, which includes face to face time and non-face to face time preparing to see the patient (eg, review of tests), Obtaining and/or reviewing separately obtained history, Documenting clinical information in the electronic or other health record, Independently interpreting results (not separately reported) and communicating results to the patient/family/caregiver, or Care coordination (not separately reported).     Each patient to whom he or she provides medical services by telemedicine is:  (1) informed of the relationship between the physician and patient and the respective role of any other health care provider with respect to management of the patient; and (2) notified that he or she may decline to receive medical services by telemedicine and may withdraw from such care at any time.    Notes:     Jean is a 47 y.o. male coming in today for low back pain, here for MRI results. Since last visit the pain has Improved. The pain is better with rest, prednisone and worse with activity.Pt. describes the pain as a 1/10 achy and burning pain that does radiate, mostly down his left leg now. There has not been any new a fall/injury/ or traumas since last visit.  Pt. denies any new musculoskeletal complaints at this time.     Office note from 6/9/22 reviewed    Review of Systems   Constitutional: Negative for chills and fever.   Musculoskeletal: Positive for back pain and myalgias. Negative for falls, joint pain and neck pain.   Neurological: Negative for dizziness, tingling, focal weakness, weakness and headaches.       PAST MEDICAL HISTORY:   Past Medical History:   Diagnosis Date    Hypertension      PAST SURGICAL HISTORY:   Past Surgical History:   Procedure Laterality Date    KNEE  SURGERY      SHOULDER SURGERY         MEDICATIONS:   Current Outpatient Medications:     diclofenac (VOLTAREN) 75 MG EC tablet, Take 1 tablet (75 mg total) by mouth 2 (two) times daily. (Patient not taking: Reported on 6/9/2022), Disp: 20 tablet, Rfl: 0    lansoprazole (PREVACID) 30 MG capsule, Take 1 capsule (30 mg total) by mouth once daily., Disp: 30 capsule, Rfl: 5    valsartan (DIOVAN) 320 MG tablet, Take 1 tablet (320 mg total) by mouth once daily., Disp: 90 tablet, Rfl: 3  ALLERGIES: Review of patient's allergies indicates:  No Known Allergies      Objective:     VITAL SIGNS: There were no vitals taken for this visit.   General    Vitals reviewed.  Constitutional: He is oriented to person, place, and time. He appears well-developed and well-nourished.   Neurological: He is alert and oriented to person, place, and time.   Psychiatric: He has a normal mood and affect. His behavior is normal.               MUSCULOSKELETAL EXAM  Lumbar Spine: left lumbar region     Observation:    Posture:  Posterior pelvis tilt with loss of lumbar lordosis  Gait: Non-antalgic      Tenderness: Unable to perform with virtual visit     Range of Motion: Unable to perform with virtual visit     Strength Testing (* = with pain): Unable to perform with virtual visit    Special Tests:   No increased pain or radicular symptoms with self performed seated straight leg raise     MRI Lumbar Spine Without Contrast  Narrative: EXAMINATION:  MRI LUMBAR SPINE WITHOUT CONTRAST    CLINICAL HISTORY:  Low back pain, symptoms persist with > 6wks conservative treatment; Dorsalgia, unspecified    TECHNIQUE:  Multiplanar, multisequence MR images were acquired from the thoracolumbar junction to the sacrum without the administration of contrast.    COMPARISON:  Lumbar x-ray 06/09/2022 and MRI lumbar spine 01/20/2015    FINDINGS:  In keeping with previous reported lumbar numbering, there is a transitional lumbosacral vertebra with lumbarization of  S1.    Alignment: L2-L3 grade 1 retrolisthesis.    Vertebrae: Low signal intensity lesion within the left posterior iliac bone corresponding to sclerotic focus on plain film, consistent with bone island.  No marrow infiltrative process.  There is mild anterior wedging of the L2 vertebral body, similar when compared to the previous exam.  No acute fractures.    Discs: Multilevel degenerative disc space narrowing and desiccation most pronounced at L2-L3 and L4-L5.  No endplate edema.    Cord: Normal contour and signal.  Conus terminates at L1.    Degenerative findings:    T12-L1: No spinal canal stenosis or neural foraminal narrowing.    L1-L2: No spinal canal stenosis or no neural foraminal narrowing.    L2-L3: Grade 1 retrolisthesis.  Symmetric circumferential disc bulge with extension into the bilateral foraminal and extraforaminal zones.  Bilateral facet arthropathy and bilateral ligamentum flavum buckling.  Findings contribute to mild spinal canal stenosis.  No neural foraminal narrowing.    L3-L4: Symmetric circumferential disc bulge with extension into the foraminal and extraforaminal zones.  Bilateral facet arthropathy and bilateral ligamentum flavum buckling.  Findings contribute to mild left neural foraminal narrowing.  No spinal canal stenosis.    L4-L5: Circumferential disc bulge extends into the bilateral foraminal zones and demonstrates a superimposed central/right paracentral broad-based protrusion that causes mass effect on the anterior thecal sac and right lateral recess and likely abuts the right descending L5 nerve root.  Bilateral facet arthropathy, bilateral ligamentum flavum buckling and prominent posterior epidural fat.  Findings contribute to severe spinal canal stenosis and moderate bilateral neural foraminal narrowing.    L5-S1: Circumferential disc bulge encroaching on the right foraminal zone.  Mild bilateral neural foraminal narrowing.  No spinal canal stenosis.    Paraspinal muscles &  soft tissues: Unremarkable.  Impression: Multilevel degenerative changes of the lumbar spine most pronounced at L4-L5 noting severe spinal canal stenosis and moderate neural foraminal narrowing.    Electronically signed by resident: Gala Collins  Date:    06/28/2022  Time:    08:06    Electronically signed by: Pawan Inman MD  Date:    06/28/2022  Time:    10:31        Assessment:      Encounter Diagnoses   Name Primary?    Chronic bilateral low back pain with bilateral sciatica Yes    DDD (degenerative disc disease), lumbar     Lumbar spondylosis     Spinal stenosis of lumbar region, unspecified whether neurogenic claudication present           Plan:   1. Chronic low back pain with underlying lumbar spondylosis and DDD. Recent lumbar spine MRI also showed severe spinal is canal stenosis at the L4-5 level.   - referral placed to orthopedic spinal surgery for further evaluation.  - continue to work on proper posture practices and HEP  - continue lumbar spine back brace as needed  - MRI  images of lumbar spine taken 12/1/15 showed degenerative changes lumbar spine,  with loss of disc height at L2-3 in L5-S1.  Images personally reviewed.  - X-ray images of lumbar spine taken today (AP, lateral, bilateral obliques, L5-S1 joint, flexion and extension views) showed no acute abnormality.  Degenerative changes in lumbar spine with stable L2-3 grade 1 retrolisthesis.  Images were personally reviewed with patient.  - MRI  images of lumbar spine taken 6/28/22 showed Multilevel degenerative changes of the lumbar spine most pronounced at L4-L5 noting severe spinal canal stenosis and moderate neural foraminal narrowing. Images were personally reviewed with patient.     2. Reviewed with patient the following HEP:  Continue:  A)  Pelvic clock exercises given to do from the 6-12 o'clock positions:10-15 reps, twice daily. Hand out of exercise also given.   B) Seated anterior pelvic tilt exercise: rotate pelvis forward to sit on  ischial tuberosities while maintaining neutral shoulder positioning. Repeat frequently throughout the day.   C) Seated dural stretch exercise on Bilateral  While seated lift leg, extending knee and plantarflexing ankle until sensation of tension is appreciated, then immediately back off tension. Repeat exercise 15-20 reps, twice Daily. Handout also given.      The patient was taught a homegoing physical therapy regimen as described above. The patient demonstrated understanding of the exercises and proper technique of their execution.      3. Follow-up in 2 weeks for reevaluation and review of lumbar spine MRI results     4. Patient agreeable to today's plan and all questions were answered        This note is dictated using the M*Modal Fluency Direct word recognition program. There are word recognition mistakes that are occasionally missed on review.

## 2022-06-30 ENCOUNTER — OFFICE VISIT (OUTPATIENT)
Dept: ORTHOPEDICS | Facility: CLINIC | Age: 47
End: 2022-06-30
Payer: COMMERCIAL

## 2022-06-30 ENCOUNTER — OFFICE VISIT (OUTPATIENT)
Dept: SPORTS MEDICINE | Facility: CLINIC | Age: 47
End: 2022-06-30
Payer: COMMERCIAL

## 2022-06-30 VITALS — HEIGHT: 74 IN | BODY MASS INDEX: 34.81 KG/M2 | WEIGHT: 271.25 LBS

## 2022-06-30 DIAGNOSIS — M47.816 LUMBAR SPONDYLOSIS: ICD-10-CM

## 2022-06-30 DIAGNOSIS — M48.061 SPINAL STENOSIS OF LUMBAR REGION, UNSPECIFIED WHETHER NEUROGENIC CLAUDICATION PRESENT: Primary | ICD-10-CM

## 2022-06-30 DIAGNOSIS — M54.16 LUMBAR RADICULOPATHY: Primary | ICD-10-CM

## 2022-06-30 DIAGNOSIS — G89.29 CHRONIC BILATERAL LOW BACK PAIN WITH BILATERAL SCIATICA: ICD-10-CM

## 2022-06-30 DIAGNOSIS — M48.061 SPINAL STENOSIS OF LUMBAR REGION, UNSPECIFIED WHETHER NEUROGENIC CLAUDICATION PRESENT: ICD-10-CM

## 2022-06-30 DIAGNOSIS — M54.41 CHRONIC BILATERAL LOW BACK PAIN WITH BILATERAL SCIATICA: ICD-10-CM

## 2022-06-30 DIAGNOSIS — M51.36 DDD (DEGENERATIVE DISC DISEASE), LUMBAR: ICD-10-CM

## 2022-06-30 DIAGNOSIS — M54.42 CHRONIC BILATERAL LOW BACK PAIN WITH BILATERAL SCIATICA: ICD-10-CM

## 2022-06-30 PROCEDURE — 99204 PR OFFICE/OUTPT VISIT, NEW, LEVL IV, 45-59 MIN: ICD-10-PCS | Mod: S$GLB,,, | Performed by: ORTHOPAEDIC SURGERY

## 2022-06-30 PROCEDURE — 3008F PR BODY MASS INDEX (BMI) DOCUMENTED: ICD-10-PCS | Mod: CPTII,S$GLB,, | Performed by: ORTHOPAEDIC SURGERY

## 2022-06-30 PROCEDURE — 1159F PR MEDICATION LIST DOCUMENTED IN MEDICAL RECORD: ICD-10-PCS | Mod: CPTII,95,, | Performed by: NEUROMUSCULOSKELETAL MEDICINE & OMM

## 2022-06-30 PROCEDURE — 4010F ACE/ARB THERAPY RXD/TAKEN: CPT | Mod: CPTII,95,, | Performed by: NEUROMUSCULOSKELETAL MEDICINE & OMM

## 2022-06-30 PROCEDURE — 4010F ACE/ARB THERAPY RXD/TAKEN: CPT | Mod: CPTII,S$GLB,, | Performed by: ORTHOPAEDIC SURGERY

## 2022-06-30 PROCEDURE — 4010F PR ACE/ARB THEARPY RXD/TAKEN: ICD-10-PCS | Mod: CPTII,S$GLB,, | Performed by: ORTHOPAEDIC SURGERY

## 2022-06-30 PROCEDURE — 1160F RVW MEDS BY RX/DR IN RCRD: CPT | Mod: CPTII,S$GLB,, | Performed by: ORTHOPAEDIC SURGERY

## 2022-06-30 PROCEDURE — 3008F BODY MASS INDEX DOCD: CPT | Mod: CPTII,S$GLB,, | Performed by: ORTHOPAEDIC SURGERY

## 2022-06-30 PROCEDURE — 1159F MED LIST DOCD IN RCRD: CPT | Mod: CPTII,S$GLB,, | Performed by: ORTHOPAEDIC SURGERY

## 2022-06-30 PROCEDURE — 4010F PR ACE/ARB THEARPY RXD/TAKEN: ICD-10-PCS | Mod: CPTII,95,, | Performed by: NEUROMUSCULOSKELETAL MEDICINE & OMM

## 2022-06-30 PROCEDURE — 1160F RVW MEDS BY RX/DR IN RCRD: CPT | Mod: CPTII,95,, | Performed by: NEUROMUSCULOSKELETAL MEDICINE & OMM

## 2022-06-30 PROCEDURE — 1160F PR REVIEW ALL MEDS BY PRESCRIBER/CLIN PHARMACIST DOCUMENTED: ICD-10-PCS | Mod: CPTII,S$GLB,, | Performed by: ORTHOPAEDIC SURGERY

## 2022-06-30 PROCEDURE — 99999 PR PBB SHADOW E&M-EST. PATIENT-LVL III: CPT | Mod: PBBFAC,,, | Performed by: ORTHOPAEDIC SURGERY

## 2022-06-30 PROCEDURE — 99204 OFFICE O/P NEW MOD 45 MIN: CPT | Mod: S$GLB,,, | Performed by: ORTHOPAEDIC SURGERY

## 2022-06-30 PROCEDURE — 99999 PR PBB SHADOW E&M-EST. PATIENT-LVL III: ICD-10-PCS | Mod: PBBFAC,,, | Performed by: ORTHOPAEDIC SURGERY

## 2022-06-30 PROCEDURE — 99214 OFFICE O/P EST MOD 30 MIN: CPT | Mod: 95,,, | Performed by: NEUROMUSCULOSKELETAL MEDICINE & OMM

## 2022-06-30 PROCEDURE — 99214 PR OFFICE/OUTPT VISIT, EST, LEVL IV, 30-39 MIN: ICD-10-PCS | Mod: 95,,, | Performed by: NEUROMUSCULOSKELETAL MEDICINE & OMM

## 2022-06-30 PROCEDURE — 1159F MED LIST DOCD IN RCRD: CPT | Mod: CPTII,95,, | Performed by: NEUROMUSCULOSKELETAL MEDICINE & OMM

## 2022-06-30 PROCEDURE — 1160F PR REVIEW ALL MEDS BY PRESCRIBER/CLIN PHARMACIST DOCUMENTED: ICD-10-PCS | Mod: CPTII,95,, | Performed by: NEUROMUSCULOSKELETAL MEDICINE & OMM

## 2022-06-30 PROCEDURE — 1159F PR MEDICATION LIST DOCUMENTED IN MEDICAL RECORD: ICD-10-PCS | Mod: CPTII,S$GLB,, | Performed by: ORTHOPAEDIC SURGERY

## 2022-06-30 RX ORDER — GABAPENTIN 300 MG/1
300 CAPSULE ORAL 3 TIMES DAILY
Qty: 60 CAPSULE | Refills: 1 | Status: SHIPPED | OUTPATIENT
Start: 2022-06-30 | End: 2023-05-18

## 2022-06-30 RX ORDER — MELOXICAM 15 MG/1
15 TABLET ORAL DAILY
Qty: 60 TABLET | Refills: 1 | Status: SHIPPED | OUTPATIENT
Start: 2022-06-30 | End: 2023-05-18

## 2022-06-30 RX ORDER — CYCLOBENZAPRINE HCL 10 MG
10 TABLET ORAL 3 TIMES DAILY PRN
Qty: 60 TABLET | Refills: 1 | Status: SHIPPED | OUTPATIENT
Start: 2022-06-30 | End: 2023-04-18 | Stop reason: SDUPTHER

## 2022-06-30 NOTE — PROGRESS NOTES
DATE: 6/30/2022  PATIENT: Jean Martinez    Attending Physician: Konstantin Sheldon M.D.    CHIEF COMPLAINT: LBP and LLE pain and R buttock pain    HISTORY:  Jean Martinez is a 47 y.o. male here for initial evaluation of low back and left leg pain (Back - 5, Leg - 5). The pain has been present for 30 years but it was worsened 3 months ago after he carried his daughter (115lbs) over his shoulders to watch the parades. The patient describes the pain as sharp and it radiates posteriorly down the left buttock/thigh to the toes. He also has pain in the R buttock, but the worst pain is in the left hip. The pain is worse with standing and improved by sitting. There is associated numbness and tingling in the LLE. There is no subjective weakness. Prior treatments have included medrol dose pack, chiropractor, but no PT, VENU or surgery.    The Patient denies myelopathic symptoms such as handwriting changes or difficulty with buttons/coins/keys. Denies perineal paresthesias, bowel/bladder dysfunction.    The patient does not smoke, have DM or endorse IVDU. The patient is not on any blood thinners and does not take chronic narcotics. He works on security design.    PAST MEDICAL/SURGICAL HISTORY:  Past Medical History:   Diagnosis Date    Hypertension      Past Surgical History:   Procedure Laterality Date    KNEE SURGERY      SHOULDER SURGERY         Current Medications:   Current Outpatient Medications:     valsartan (DIOVAN) 320 MG tablet, Take 1 tablet (320 mg total) by mouth once daily., Disp: 90 tablet, Rfl: 3    cyclobenzaprine (FLEXERIL) 10 MG tablet, Take 1 tablet (10 mg total) by mouth 3 (three) times daily as needed for Muscle spasms., Disp: 60 tablet, Rfl: 1    gabapentin (NEURONTIN) 300 MG capsule, Take 1 capsule (300 mg total) by mouth 3 (three) times daily., Disp: 60 capsule, Rfl: 1    lansoprazole (PREVACID) 30 MG capsule, Take 1 capsule (30 mg total) by mouth once daily., Disp: 30 capsule, Rfl: 5    meloxicam  "(MOBIC) 15 MG tablet, Take 1 tablet (15 mg total) by mouth once daily., Disp: 60 tablet, Rfl: 1    Social History:   Social History     Socioeconomic History    Marital status:    Tobacco Use    Smoking status: Never Smoker    Smokeless tobacco: Never Used       REVIEW OF SYSTEMS:  Constitution: Negative. Negative for chills, fever and night sweats.   Cardiovascular: Negative for chest pain and syncope.   Respiratory: Negative for cough and shortness of breath.   Gastrointestinal: See HPI. Negative for nausea/vomiting. Negative for abdominal pain.  Genitourinary: See HPI. Negative for discoloration or dysuria.  Hematologic/Lymphatic: negative for bleeding/clotting disorders.   Musculoskeletal: Negative for falls and muscle weakness.   Neurological: See HPI. no history of seizures. no history of cranial surgery or shunts.  Neurological: See HPI. No seizures.   Endocrine: Negative for polydipsia, polyphagia and polyuria.   Allergic/Immunologic: Negative for hives and persistent infections.     EXAM:  Ht 6' 2" (1.88 m)   Wt 123.1 kg (271 lb 4.4 oz)   BMI 34.83 kg/m²     PHYSICAL EXAMINATION:    General: The patient is a 47 y.o. male in no apparent distress, the patient is orientatied to person, place and time.  Psych: Normal mood and affect  HEENT: Vision grossly intact, hearing intact to the spoken word.  Lungs: Respirations unlabored.  Gait: Normal station and gait, no difficulty with toe or heel walk.   Skin: Dorsal lumbar skin negative for rashes, lesions, hairy patches and surgical scars. There is lower lumbar tenderness to palpation.  Range of motion: Lumbar range of motion is acceptable.  Spinal Balance: Global saggital and coronal spinal balance acceptable, no significant for scoliosis and kyphosis.  Musculoskeletal: No pain with the range of motion of the bilateral hips. No trochanteric tenderness to palpation.  Vascular: Bilateral lower extremities warm and well perfused, Dorsalis pedis pulses 2+ " bilaterally.  Neurological: Normal strength and tone in all major motor groups in the bilateral lower extremities. Normal sensation to light touch in the L2-S1 dermatomes bilaterally.  Deep tendon reflexes symmetric 2+ in the bilateral lower extremities.  + L SLR    IMAGING:   Today I independently reviewed the following images and my interpretations are as follows:    AP, Lat and Flex/Ex  upright L-spine demonstrate lumbar spondylosis without listhesis.    MRI lumbar showed L4-5 HNP with severe central stenosis and moderate bilateral foraminal stenosis.      Body mass index is 34.83 kg/m².  No results found for: HGBA1C    ASSESSMENT/PLAN:    Jean was seen today for low-back pain and leg pain.    Diagnoses and all orders for this visit:    Lumbar radiculopathy    DDD (degenerative disc disease), lumbar  -     Ambulatory referral[facility]consult to [facility]    Lumbar spondylosis  -     Ambulatory referral/consult to [facility]  -     meloxicam (MOBIC) 15 MG tablet; Take 1 tablet (15 mg total) by mouth once daily.  -     cyclobenzaprine (FLEXERIL) 10 MG tablet; Take 1 tablet (10 mg total) by mouth 3 (three) times daily as needed for Muscle spasms.  -     gabapentin (NEURONTIN) 300 MG capsule; Take 1 capsule (300 mg total) by mouth 3 (three) times daily.  -     Ambulatory referral/consult to Physical/Occupational Therapy; Future    Spinal stenosis of lumbar region, unspecified whether neurogenic claudication present  -     Ambulatory referral/consult to [facility]      Follow up in about 4 weeks (around 7/28/2022).    Patient has lumbar stenosis and LLE radiculopathy. I discussed the natural history of their diagnoses as well as surgical and nonsurgical treatment options. I educated the patient on the importance of core/back strengthening, correct posture, bending/lifting ergonomics, and low-impact aerobic exercises (walking, elliptical, and aquatherapy). I prescribed mobic, flexeril and  gabapentin. I will refer the patient to PT for core/back strengthening. Patient will follow up in 4 weeks for re-evaluation. Next step is a L4-5 VENU by Pain Management.    Konstantin Sheldon MD  Orthopaedic Spine Surgeon  Department of Orthopaedic Surgery  [phone]

## 2022-07-12 ENCOUNTER — CLINICAL SUPPORT (OUTPATIENT)
Dept: REHABILITATION | Facility: HOSPITAL | Age: 47
End: 2022-07-12
Attending: ORTHOPAEDIC SURGERY
Payer: COMMERCIAL

## 2022-07-12 DIAGNOSIS — M54.41 CHRONIC BILATERAL LOW BACK PAIN WITH BILATERAL SCIATICA: ICD-10-CM

## 2022-07-12 DIAGNOSIS — G89.29 CHRONIC BILATERAL LOW BACK PAIN WITH BILATERAL SCIATICA: ICD-10-CM

## 2022-07-12 DIAGNOSIS — M54.42 CHRONIC BILATERAL LOW BACK PAIN WITH BILATERAL SCIATICA: ICD-10-CM

## 2022-07-12 DIAGNOSIS — M47.816 LUMBAR SPONDYLOSIS: ICD-10-CM

## 2022-07-12 DIAGNOSIS — C92.00 CORE BINDING FACTOR ACUTE MYELOID LEUKEMIA (AML): ICD-10-CM

## 2022-07-12 PROCEDURE — 97110 THERAPEUTIC EXERCISES: CPT

## 2022-07-12 PROCEDURE — 97161 PT EVAL LOW COMPLEX 20 MIN: CPT

## 2022-07-12 NOTE — PROGRESS NOTES
See evaluation in POC for goals and assessment     Eval Date: 07/15/2022    Mag Menchaca, PT, DPT, CLT

## 2022-07-13 NOTE — PROGRESS NOTES
[facility]   Physical Therapy Treatment Note     Name: Jean Martinez  Clinic Number: [id number]    Therapy Diagnosis:   Encounter Diagnoses   Name Primary?   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 He hasnt had pain going down his legs for about 2-3 weeks now . Pt stated he was able to ride his bike yesterday for 2 miles .   He was compliant with home exercise program.  Response to previous treatment: no adverse effects   Functional change: none    Pain: 0/10  Location: B lumbar spine     OBJECTIVE     Objective Measures updated at progress report unless specified.     Treatment     Jean received the treatments listed below:      therapeutic exercises to develop strength, endurance, ROM, flexibility, posture and core stabilization for 60 minutes including:    Recumbent Bike: 5' L 3  SKTC stretch : 3 x 30''  Piriformis stretch : 3 x 30''   Active HSS : x15 reps , 5'' hold  Posterior pelvic tilts c/ diaphragmatic breathing : 15 x 10'' hold  PPT c/ single knee fallout , GTB  PPT c/ march , GTB  Bridges : 2 x 10 reps   SL hip abduction : 2 x 10 reps       NP - continue HEP:   DKTC c/ SB       Patient Education and Home Exercises     Home Exercises Provided and Patient Education Provided     Education provided:   - Continue HEP provided at [facility]  and new HEP provided today - reviewed in full with good pt understanding     Written Home Exercises Provided: yes. Exercises were reviewed and Jean was able to demonstrate them prior to the end of the session.  Jean demonstrated good  understanding of the education provided. See EMR under Patient Instructions for exercises provided during therapy sessions    ASSESSMENT     Pt exhibits increased tightness and weakness in his L>R HS, ITB and abductors . Session focused on progressing HEP to incorporate core strengthening/stability and LE strengthening and mobility which he tolerated well with no adverse effects. He would benefit from continued strength progressions next.     Jean Is progressing well towards his goals.   Pt prognosis is Good.     Pt will continue to benefit from skilled outpatient physical therapy to address the deficits listed in the problem list box on initial evaluation, provide pt/family education and to maximize pt's level of independence in the home and community environment.     Pt's spiritual, cultural and educational needs considered and pt agreeable to plan of care and goals.     Anticipated barriers to physical therapy: none    Goals:  Short Term Goals: 3 weeks   1. In 3 weeks, pt will be able to perform 10 pelvic tilts with no cueing   2. In 3 weeks, pt will report 0/10 pain at rest   3. In 3 weeks, pt will report 20% Improvement in activity tolerance      Long Term Goals: 6 weeks   1. In 6 weeks, pt will be independent with HEP   2. In 6 weeks, pt will have 5/5 MMTs   3. In 6 weeks, pt will report 40% improvement in returning to exercise     PLAN     Continue to progress core and hip strengthening.     Bhavna Lowry, PTA

## 2022-07-14 ENCOUNTER — CLINICAL SUPPORT (OUTPATIENT)
Dept: REHABILITATION | Facility: HOSPITAL | Age: 47
End: 2022-07-14
Payer: COMMERCIAL

## 2022-07-14 DIAGNOSIS — C92.00 CORE BINDING FACTOR ACUTE MYELOID LEUKEMIA (AML): ICD-10-CM

## 2022-07-14 DIAGNOSIS — M54.41 CHRONIC BILATERAL LOW BACK PAIN WITH BILATERAL SCIATICA: Primary | ICD-10-CM

## 2022-07-14 DIAGNOSIS — G89.29 CHRONIC BILATERAL LOW BACK PAIN WITH BILATERAL SCIATICA: Primary | ICD-10-CM

## 2022-07-14 DIAGNOSIS — M54.42 CHRONIC BILATERAL LOW BACK PAIN WITH BILATERAL SCIATICA: Primary | ICD-10-CM

## 2022-07-14 PROCEDURE — 97110 THERAPEUTIC EXERCISES: CPT | Mod: CQ

## 2022-07-15 PROBLEM — M54.41 CHRONIC BILATERAL LOW BACK PAIN WITH BILATERAL SCIATICA: Status: ACTIVE | Noted: 2022-07-15

## 2022-07-15 PROBLEM — M54.42 CHRONIC BILATERAL LOW BACK PAIN WITH BILATERAL SCIATICA: Status: ACTIVE | Noted: 2022-07-15

## 2022-07-15 PROBLEM — C92.00: Status: ACTIVE | Noted: 2022-07-15

## 2022-07-15 PROBLEM — G89.29 CHRONIC BILATERAL LOW BACK PAIN WITH BILATERAL SCIATICA: Status: ACTIVE | Noted: 2022-07-15

## 2022-07-15 NOTE — PLAN OF CARE
[facility]   Physical Therapy Initial Evaluation     Date: 7/12/2022   Name: Jean Martinez  Clinic Number: [id number]    Therapy Diagnosis:   Encounter Diagnoses   Name Primary?   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     History of current condition - Jean reports: Around [employer] [place] [place], he had his daughter on his shoulders and the next day he had significant back pain. He has tried chiro, a steriod injection, and a steroid pack. Denies saddle anesthesia, loss of bowel or bladder function, and weight loss or weight gain     Exercise includes : Yoga, strength training,     Falls: None     Imaging,       Impression:     Multilevel degenerative changes of the lumbar spine most pronounced at L4-L5 noting severe spinal canal stenosis and moderate neural foraminal narrowing.    Prior Therapy: Yes, for prior surgeries   Social History:  Lives with wife and 2 kids   Occupation: Security and fire alarm     Prior Level of Function: Indepndent   Current Level of Function: standing for too long    Pain:    Current 1/10, worst 8/10, best 0/10   Location: Low back, right above tailbone, going onto the back  Description: Sharp, aching   Aggravating Factors: Standing up for too long, holding something overhead   Easing Factors:  Bending  over     Patients goals: Get back to the gym      Medical History:   Past Medical History:   Diagnosis Date    Hypertension        Surgical History:   Jean Martinez  has a past surgical history that includes Knee surgery and Shoulder surgery.    Medications:   Jean has a current medication list which includes the following prescription(s): cyclobenzaprine, gabapentin, lansoprazole, meloxicam, and valsartan.    Allergies:   Review of patient's allergies indicates:  No Known Allergies       OBJECTIVE     Pt reports using a back brace     Observation: Pt arrives with no AD     Posture:  Forward head posture     Lumbar Range of Motion:    % limited  Pain   Flexion WNL    yes        Extension WNL    yes        Left Side Bending WNL  yes        Right Side Bending More limited on R  yes        Left rotation   25% limited  Slight pulling on L back         Right Rotation   25% limited  Slight pulling on L back              Lower Extremity Strength   Right LE  Left LE    Knee extension: 5/5 Knee extension: 5/5   Knee flexion: 5/5 Knee flexion: 5/5   Hip flexion: 4+/5 Hip flexion: 4+/5   Hip extension:  4/5 Hip extension: 4/5   Hip abduction: 4+/5 Hip abduction: 4+/5   Hip adduction: 5/5 Hip adduction 5/5   Ankle dorsiflexion: 5/5 Ankle dorsiflexion: 5/5   Ankle plantarflexion: 5/5 Ankle plantarflexion: 5/5         Special Tests:  -Bridge Test: pain with bridge   -OH Squat: lumbar loridosis        DTR:   Right Left Comment   Patellar (L3-4) 2+ 2+        Neuro Dynamic Testing:    Sciatic nerve:      SLR: R =  -      L = -              Palpation: tender around B low back, no sacral tenderness     Sensation: Intact to light touch       Limitation/Restriction for FOTO Lumbar Spine  Survey    Therapist reviewed FOTO scores for Jean Martinez on 7/12/2022.   FOTO documents entered into EP[employer] - see Media section.    Limitation Score: 43%         TREATMENT     Total Treatment time (time-based codes) separate from Evaluation: 20 minutes       Jean received the treatments listed below:      therapeutic exercises to develop strength, endurance, ROM, flexibility, posture and core stabilization for 20 minutes including:  Piriformis stretch   LtRs     PATIENT EDUCATION AND HOME EXERCISES     Education provided:   - POC, HEP, stop HEP if painful     Written Home Exercises Provided: yes. Exercises were reviewed and Jean was able to demonstrate them prior to the end of the session.  Jean demonstrated good  understanding of the education provided. See EMR under Patient Instructions for exercises provided during therapy sessions.    ASSESSMENT     Jean is a 47 y.o. male referred to outpatient Physical Therapy with a medical diagnosis of M47.816 (ICD-10-CM) - Lumbar spondylosis   . Patient presents with decreased low back ROM, LE weakness, poor core control, and decreased functional exericse tolerance. Pt's LBP is low today and his SLR test was negative despite reports of radicular pain. Pt was able to use exercises given for HEP to decrease his pain. Pt would benefit from therapy to decrease pain and return pt to prior exercise.     Patient prognosis is Good.   Patient will benefit from skilled outpatient Physical Therapy to address the deficits stated above and in the chart below, provide patient /family education, and to maximize patientt's level of independence.     Plan of care discussed with patient: Yes  Patient's spiritual, cultural and educational needs considered and patient is agreeable to the plan of care and goals as stated below:     Anticipated Barriers for therapy: none     Medical Necessity is demonstrated by the following  History  Co-morbidities and personal factors that may impact the plan of care Co-morbidities:   HTN    Personal Factors:   no deficits     low   Examination  Body Structures and Functions, activity limitations and participation restrictions that may impact the plan of care Body Regions:   lower extremities    Body Systems:     gross symmetry  ROM  strength  gross coordinated movement    Participation Restrictions:   Exercise      Activity limitations:   Learning and applying knowledge  no deficits    General Tasks and Commands  no deficits    Communication  no deficits    Mobility  no deficits    Self care  no deficits    Domestic Life  no deficits    Interactions/Relationships  no deficits    Life Areas  no deficits    Community and Social Life  no deficits         low   Clinical Presentation evolving clinical presentation with changing clinical characteristics moderate   Decision Making/ Complexity Score: low     Goals:  Short Term Goals: 3 weeks   1. In 3 weeks, pt will be able to perform 10 pelvic tilts with no cueing   2. In 3 weeks, pt will report 0/10 pain at rest   3. In 3 weeks, pt will report 20% Improvement in activity tolerance     Long Term Goals: 6 weeks   1. In 6 weeks, pt will be independent with HEP   2. In 6 weeks, pt will have 5/5 MMTs   3. In 6 weeks, pt will report 40% improvement in returning to exercise     PLAN   Plan of care Certification: 7/12/2022 to  8/26/2022.    Outpatient Physical Therapy 2 times weekly for 6 weeks to include the following interventions: Gait Training, Manual Therapy, Neuromuscular Re-ed, Patient Education, Self Care, Therapeutic Activities and Therapeutic Exercise.     Mag Menchaca, PT      I CERTIFY THE NEED FOR THESE SERVICES FURNISHED UNDER THIS PLAN OF TREATMENT AND WHILE UNDER MY CARE   Physician's comments:     Physician's Signature: ___________________________________________________

## 2022-07-20 NOTE — PROGRESS NOTES
[facility]   Physical Therapy Treatment Note     Name: Jean Martinez  Clinic Number: [id number]    Therapy Diagnosis:   Encounter Diagnoses   Name Primary?   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)      Physician: Konstantin Sheldon MD    Visit Date: 7/21/2022    Physician Orders: PT Eval and Treat   Medical Diagnosis from Referral: M47.816 (ICD-10-CM) - Lumbar spondylosis   Evaluation Date: 7/12/2022  Authorization Period Expiration: 12/31/2022  Plan of Care Expiration: 8/26/2022  Progress Note Due: 8/5/2022  Visit # / Visits authorized: 2/ 20  FOTO: 1/3    PTA Visit #: 2/5     Time In: 9:00 am  Time Out: 10:00 am  Total Treatment Time: 30 minutes   Total Billable Time: 60 minutes    Precautions: Standard   SUBJECTIVE     Pt reports: he has been doing well with no pain or flare ups since his last session. Pt stated he noticed increased pain in his L hip on the inside when performing piriformis stretch , only with certain movements and not constant.   He was compliant with home exercise program.  Response to previous treatment: no adverse effects   Functional change: none    Pain: 0/10  Location: B lumbar spine     OBJECTIVE     Objective Measures updated at progress report unless specified.     Treatment     Jean received the treatments listed below:      therapeutic exercises to develop strength, endurance, ROM, flexibility, posture and core stabilization for 60 minutes including:    Recumbent Bike: 7' L 4  Piriformis stretch : 3 x 30''  ( pushing away on LLE)   Posterior pelvic tilts c/ diaphragmatic breathing : 20 x 10'' hold  PPT c/ single knee fallout 3'' hold, GTB: 2 x 10 reps   PPT c/ march , GTB: 2 x 10 reps   Bridges c/ GTB: 3 x 10 reps , 3'' hold   SL hip abduction : 2 x 10 reps   Palloff press red thera-cord : 2 x 10 reps       NP - continue HEP:   DKTC c/ SB   SKTC stretch : 3 x 30''  Active HSS : x15 reps ,  5'' hold    Patient Education and Home Exercises     Home Exercises Provided and Patient Education Provided     Education provided:   - Continue HEP provided at al and new HEP provided today - reviewed in full with good pt understanding     Written Home Exercises Provided: yes. Exercises were reviewed and Jean was able to demonstrate them prior to the end of the session.  Jean demonstrated good  understanding of the education provided. See EMR under Patient Instructions for exercises provided during therapy sessions    ASSESSMENT     LLE stretching modified to reduce L medial hip pain which he reports as a sharp pain when going into adduction and IR. Pt demonstrated good core activation and able to maintain core stabilization with dual activity . He would benefit from continued strength progressions next.     Jean Is progressing well towards his goals.   Pt prognosis is Good.     Pt will continue to benefit from skilled outpatient physical therapy to address the deficits listed in the problem list box on initial evaluation, provide pt/family education and to maximize pt's level of independence in the home and community environment.     Pt's spiritual, cultural and educational needs considered and pt agreeable to plan of care and goals.     Anticipated barriers to physical therapy: none    Goals:  Short Term Goals: 3 weeks   1. In 3 weeks, pt will be able to perform 10 pelvic tilts with no cueing   2. In 3 weeks, pt will report 0/10 pain at rest   3. In 3 weeks, pt will report 20% Improvement in activity tolerance      Long Term Goals: 6 weeks   1. In 6 weeks, pt will be independent with HEP   2. In 6 weeks, pt will have 5/5 MMTs   3. In 6 weeks, pt will report 40% improvement in returning to exercise     PLAN     Continue to progress core and hip strengthening.     Bhavna Lowry, PTA

## 2022-07-21 ENCOUNTER — CLINICAL SUPPORT (OUTPATIENT)
Dept: REHABILITATION | Facility: HOSPITAL | Age: 47
End: 2022-07-21
Payer: COMMERCIAL

## 2022-07-21 DIAGNOSIS — C92.00 CORE BINDING FACTOR ACUTE MYELOID LEUKEMIA (AML): ICD-10-CM

## 2022-07-21 DIAGNOSIS — G89.29 CHRONIC BILATERAL LOW BACK PAIN WITH BILATERAL SCIATICA: Primary | ICD-10-CM

## 2022-07-21 DIAGNOSIS — M54.42 CHRONIC BILATERAL LOW BACK PAIN WITH BILATERAL SCIATICA: Primary | ICD-10-CM

## 2022-07-21 DIAGNOSIS — M54.41 CHRONIC BILATERAL LOW BACK PAIN WITH BILATERAL SCIATICA: Primary | ICD-10-CM

## 2022-07-21 PROCEDURE — 97110 THERAPEUTIC EXERCISES: CPT | Mod: CQ

## 2022-08-02 ENCOUNTER — CLINICAL SUPPORT (OUTPATIENT)
Dept: REHABILITATION | Facility: HOSPITAL | Age: 47
End: 2022-08-02
Payer: COMMERCIAL

## 2022-08-02 DIAGNOSIS — G89.29 CHRONIC BILATERAL LOW BACK PAIN WITH BILATERAL SCIATICA: Primary | ICD-10-CM

## 2022-08-02 DIAGNOSIS — C92.00 CORE BINDING FACTOR ACUTE MYELOID LEUKEMIA (AML): ICD-10-CM

## 2022-08-02 DIAGNOSIS — M54.41 CHRONIC BILATERAL LOW BACK PAIN WITH BILATERAL SCIATICA: Primary | ICD-10-CM

## 2022-08-02 DIAGNOSIS — M54.42 CHRONIC BILATERAL LOW BACK PAIN WITH BILATERAL SCIATICA: Primary | ICD-10-CM

## 2022-08-02 PROCEDURE — 97110 THERAPEUTIC EXERCISES: CPT

## 2022-08-02 NOTE — PROGRESS NOTES
[facility]   Physical Therapy Treatment Note     Name: Jean Martinez  Clinic Number: [id number]    Therapy Diagnosis:   Encounter Diagnoses   Name Primary?   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)      Physician: Konstantin Sheldon MD    Visit Date: 8/2/2022    Physician Orders: PT Eval and Treat   Medical Diagnosis from Referral: M47.816 (ICD-10-CM) - Lumbar spondylosis   Evaluation Date: 7/12/2022  Authorization Period Expiration: 12/31/2022  Plan of Care Expiration: 8/26/2022  Progress Note Due: 8/5/2022  Visit # / Visits authorized: 3/ 20  FOTO: 1/3    PTA Visit #: 0/5     Time In: 10:00am   Time Out: 11:00am   Total Treatment Time: 60 minutes   Total Billable Time: 60 minutes    Precautions: Standard   SUBJECTIVE     Pt reports: He was out of town and didn't have a lot of back pain. Feels like therapy has gone well so far   He was compliant with home exercise program.  Response to previous treatment: no adverse effects   Functional change: none    Pain: 0/10  Location: B lumbar spine     OBJECTIVE     Objective Measures updated at progress report unless specified.     Treatment     Jean received the treatments listed below:      therapeutic exercises to develop strength, endurance, ROM, flexibility, posture and core stabilization for 60 minutes including:    Recumbent Bike: 7' L 4  Piriformis stretch : 3 x 30''  ( pushing away on LLE)   Posterior pelvic tilts c/ diaphragmatic breathing : 20 x 10'' hold  PPT c/ single knee fallout 3'' hold, GTB: 2 x 10 reps   PPT c/ march , GTB: 2 x 10 reps   Bridges c/ BlueTB: 3 x 10 reps , 3'' hold   SL hip abduction : Blue TB 2 x 10 reps   Palloff press  40lbs for L, 20lbs for R   +Chops NV  + pulls NV      NP - continue HEP:   DKTC c/ SB   SKTC stretch : 3 x 30''  Active HSS : x15 reps , 5'' hold    Patient Education and Home Exercises     Home Exercises Provided and Patient  Education Provided     Education provided:   - Continue [facility]P provided at [facility] and new HEP provided today - reviewed in full with good pt understanding     Written Home Exercises Provided: yes. Exercises were reviewed and Jean was able to demonstrate them prior to the end of the session.  Jean demonstrated good  understanding of the education provided. See EMR under Patient Instructions for exercises provided during therapy sessions    ASSESSMENT     Pt shows excellent progress with therapy thus far. Pt would benefit from progressions focusing on increasing tolerance with weights to return to previous exercise tolerance.    Jean Is progressing well towards his goals.   Pt prognosis is Good.     Pt will continue to benefit from skilled outpatient physical therapy to address the deficits listed in the problem list box on initial evaluation, provide pt/family education and to maximize pt's level of independence in the home and community environment.     Pt's spiritual, cultural and educational needs considered and pt agreeable to plan of care and goals.     Anticipated barriers to physical therapy: none    Goals:  Short Term Goals: 3 weeks   1. In 3 weeks, pt will be able to perform 10 pelvic tilts with no cueing   2. In 3 weeks, pt will report 0/10 pain at rest   3. In 3 weeks, pt will report 20% Improvement in activity tolerance      Long Term Goals: 6 weeks   1. In 6 weeks, pt will be independent with HEP   2. In 6 weeks, pt will have 5/5 MMTs   3. In 6 weeks, pt will report 40% improvement in returning to exercise     PLAN     Continue to progress core and hip strengthening.     Mag Menchaca, PT

## 2022-08-03 NOTE — PROGRESS NOTES
[facility]   Physical Therapy Treatment Note     Name: Jean Martinez  Clinic Number: [id number]    Therapy Diagnosis:   Encounter Diagnoses   Name Primary?   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)      Physician: Konstantin Sheldon MD    Visit Date: 8/4/2022    Physician Orders: PT Eval and Treat   Medical Diagnosis from Referral: M47.816 (ICD-10-CM) - Lumbar spondylosis   Evaluation Date: 7/12/2022  Authorization Period Expiration: 12/31/2022  Plan of Care Expiration: 8/26/2022  Progress Note Due: 8/5/2022  Visit # / Visits authorized: 4/ 20  FOTO: 1/3    PTA Visit #: 1/5     Time In: 8:00am   Time Out: 9:00am   Total Treatment Time: 60 minutes   Total Billable Time: 30 minutes    Precautions: Standard   SUBJECTIVE     Pt reports: a little bit of pain a little bit higher up in his low back .   He was compliant with home exercise program.  Response to previous treatment: no adverse effects   Functional change: none    Pain: 1.5/10  Location: B lumbar spine     OBJECTIVE     Objective Measures updated at progress report unless specified.     Treatment     Jean received the treatments listed below:      therapeutic exercises to develop strength, endurance, ROM, flexibility, posture and core stabilization for 60 minutes including:    Recumbent Bike: 7' L 4  Piriformis stretch : 3 x 30''  ( pushing away on LLE)   SKTC stretch : 2 x 30'' B  Bridges c/ BlueTB: 3 x 10 reps , 10'' hold   Quadruped Bird dog ; UE only then LE only: 2 x 10 reps each  Palloff press  40lbs for L, 20lbs for R   +Chops 20#: 2 x 10 reps B  +Suitcase carry 20# KB: 1 lap ( 300 feet) in R hand and 1 lap in L hand  + pulls NV      NP - continue HEP:   Posterior pelvic tilts c/ diaphragmatic breathing : 20 x 10'' hold  DKTC c/ SB   SKTC stretch : 3 x 30''  Active HSS : x15 reps , 5'' hold  PPT c/ single knee fallout 3'' hold, GTB: 2 x 10 reps   SL hip  abduction : Blue TB 2 x 10 reps   PPT c/ march , GTB: 2 x 10 reps     Patient Education and Home Exercises     Home Exercises Provided and Patient Education Provided     Education provided:   - Continue HEP provided at [facility] and new HEP provided today - reviewed in full with good pt understanding     Written Home Exercises Provided: yes. Exercises were reviewed and Jean was able to demonstrate them prior to the end of the session.  Jean demonstrated good  understanding of the education provided. See EMR under Patient Instructions for exercises provided during therapy sessions    ASSESSMENT     Pt responded well to quadruped progressions with good core stability noted. Also good tolerance to addition of chops with no increased pain reported with rotational movement .     Jean Is progressing well towards his goals.   Pt prognosis is Good.     Pt will continue to benefit from skilled outpatient physical therapy to address the deficits listed in the problem list box on initial evaluation, provide pt/family education and to maximize pt's level of independence in the home and community environment.     Pt's spiritual, cultural and educational needs considered and pt agreeable to plan of care and goals.     Anticipated barriers to physical therapy: none    Goals:  Short Term Goals: 3 weeks   1. In 3 weeks, pt will be able to perform 10 pelvic tilts with no cueing   2. In 3 weeks, pt will report 0/10 pain at rest   3. In 3 weeks, pt will report 20% Improvement in activity tolerance      Long Term Goals: 6 weeks   1. In 6 weeks, pt will be independent with HEP   2. In 6 weeks, pt will have 5/5 MMTs   3. In 6 weeks, pt will report 40% improvement in returning to exercise     PLAN     Continue to progress core and hip strengthening.     Bhavna Lowry, PTA

## 2022-08-04 ENCOUNTER — CLINICAL SUPPORT (OUTPATIENT)
Dept: REHABILITATION | Facility: HOSPITAL | Age: 47
End: 2022-08-04
Payer: COMMERCIAL

## 2022-08-04 DIAGNOSIS — G89.29 CHRONIC BILATERAL LOW BACK PAIN WITH BILATERAL SCIATICA: Primary | ICD-10-CM

## 2022-08-04 DIAGNOSIS — M54.41 CHRONIC BILATERAL LOW BACK PAIN WITH BILATERAL SCIATICA: Primary | ICD-10-CM

## 2022-08-04 DIAGNOSIS — M54.42 CHRONIC BILATERAL LOW BACK PAIN WITH BILATERAL SCIATICA: Primary | ICD-10-CM

## 2022-08-04 DIAGNOSIS — C92.00 CORE BINDING FACTOR ACUTE MYELOID LEUKEMIA (AML): ICD-10-CM

## 2022-08-04 PROCEDURE — 97110 THERAPEUTIC EXERCISES: CPT | Mod: CQ

## 2022-08-09 ENCOUNTER — CLINICAL SUPPORT (OUTPATIENT)
Dept: REHABILITATION | Facility: HOSPITAL | Age: 47
End: 2022-08-09
Payer: COMMERCIAL

## 2022-08-09 DIAGNOSIS — M54.42 CHRONIC BILATERAL LOW BACK PAIN WITH BILATERAL SCIATICA: Primary | ICD-10-CM

## 2022-08-09 DIAGNOSIS — C92.00 CORE BINDING FACTOR ACUTE MYELOID LEUKEMIA (AML): ICD-10-CM

## 2022-08-09 DIAGNOSIS — G89.29 CHRONIC BILATERAL LOW BACK PAIN WITH BILATERAL SCIATICA: Primary | ICD-10-CM

## 2022-08-09 DIAGNOSIS — M54.41 CHRONIC BILATERAL LOW BACK PAIN WITH BILATERAL SCIATICA: Primary | ICD-10-CM

## 2022-08-09 PROCEDURE — 97110 THERAPEUTIC EXERCISES: CPT

## 2022-08-09 NOTE — PROGRESS NOTES
[facility]   Physical Therapy Treatment Note     Name: Jean Martinez  Clinic Number: [id number]    Therapy Diagnosis:   Encounter Diagnoses   Name Primary?   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)      Physician: Konstantin Sheldon MD    Visit Date: 8/9/2022    Physician Orders: PT Eval and Treat   Medical Diagnosis from Referral: M47.816 (ICD-10-CM) - Lumbar spondylosis   Evaluation Date: 7/12/2022  Authorization Period Expiration: 12/31/2022  Plan of Care Expiration: 8/26/2022  Progress Note Due: 8/5/2022  Visit # / Visits authorized: 5/ 20  FOTO: 1/3    PTA Visit #: 0/5     Time In: 10:06am  Time Out: 11:00am    Total Treatment Time: 54 minutes   Total Billable Time: 54 minutes    Precautions: Standard   SUBJECTIVE     Pt reports: He was twisting while weedeating and had a back spasm so bad he had to lay in bed for the rest of the day. He got a steroid shot which helped with his pain. He is feeling ok today, with still some pain   He was compliant with home exercise program.  Response to previous treatment: no adverse effects   Functional change: none    Pain: 3/10  Location: B lumbar spine     OBJECTIVE     Objective Measures updated at progress report unless specified.     Treatment     Jean received the treatments listed below:      therapeutic exercises to develop strength, endurance, ROM, flexibility, posture and core stabilization for 60 minutes including:    Recumbent Bike: 7' L 4  Piriformis stretch : 3 x 30''  ( pushing away on LLE)   SKTC stretch : 2 x 30'' B  Bridges c/ BlueTB: 3 x 10 reps , 10'' hold   Supine Hip flexor stretch x4 30'   Palloff press  40lbs for L, 20lbs for R   Palloff press walkout 40lbs for L, 20lbs for R   Wall squat with SB     NV- OH KB March         NP - continue HEP:   Posterior pelvic tilts c/ diaphragmatic breathing : 20 x 10'' hold  DKTC c/ SB   SKTC stretch : 3 x 30''  Active  HSS : x15 reps , 5'' hold  PPT c/ single knee fallout 3'' hold, GTB: 2 x 10 reps   SL hip abduction : Blue TB 2 x 10 reps   PPT c/ march , GTB: 2 x 10 reps     Patient Education and Home Exercises     Home Exercises Provided and Patient Education Provided     Education provided:   - Continue HEP provided at [facility] and new HEP provided today - reviewed in full with good pt understanding     Written Home Exercises Provided: yes. Exercises were reviewed and Jean was able to demonstrate them prior to the end of the session.  Jean demonstrated good  understanding of the education provided. See EMR under Patient Instructions for exercises provided during therapy sessions    ASSESSMENT     Pt reported decreased pain after stretching. Pt appears to have suffered an increase in symptoms after repetitive twisting motions. Future sessions will first focus on stable back exercises and then progress to stabilization in movement.     Jean Is progressing well towards his goals.   Pt prognosis is Good.     Pt will continue to benefit from skilled outpatient physical therapy to address the deficits listed in the problem list box on initial evaluation, provide pt/family education and to maximize pt's level of independence in the home and community environment.     Pt's spiritual, cultural and educational needs considered and pt agreeable to plan of care and goals.     Anticipated barriers to physical therapy: none    Goals:  Short Term Goals: 3 weeks   1. In 3 weeks, pt will be able to perform 10 pelvic tilts with no cueing   2. In 3 weeks, pt will report 0/10 pain at rest   3. In 3 weeks, pt will report 20% Improvement in activity tolerance      Long Term Goals: 6 weeks   1. In 6 weeks, pt will be independent with HEP   2. In 6 weeks, pt will have 5/5 MMTs   3. In 6 weeks, pt will report 40% improvement in returning to exercise     PLAN     Continue to progress core and hip strengthening.     Mag Menchaca, PT

## 2022-08-10 NOTE — PROGRESS NOTES
[facility]   Physical Therapy Treatment Note     Name: Jean Martinez  Clinic Number: [id number]    Therapy Diagnosis:   Encounter Diagnoses   Name Primary?   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)      Physician: Konstantin Sheldon MD    Visit Date: 8/11/2022    Physician Orders: PT Eval and Treat   Medical Diagnosis from Referral: M47.816 (ICD-10-CM) - Lumbar spondylosis   Evaluation Date: 7/12/2022  Authorization Period Expiration: 12/31/2022  Plan of Care Expiration: 8/26/2022  Progress Note Due: 8/5/2022  Visit # / Visits authorized: 6/ 20  FOTO: 1/3    PTA Visit #: 1/5     Time In: 11:15 am   Time Out: 12:10 pm    Total Treatment Time: 55 minutes   Total Billable Time: 30 minutes    Precautions: Standard   SUBJECTIVE     Pt reports: he is still experiencing a grabbing tightness across his low back since Sunday .   He was compliant with home exercise program.  Response to previous treatment: no adverse effects   Functional change: none    Pain: 3/10  Location: B lumbar spine     OBJECTIVE     Objective Measures updated at progress report unless specified.     Treatment     Jean received the treatments listed below:      therapeutic exercises to develop strength, endurance, ROM, flexibility, posture and core stabilization for 45 minutes including:    Recumbent Bike: 7' L 4  Piriformis stretch : 3 x 30''  ( pushing away on LLE)   SKTC stretch : 3 x 30'' B  Posterior pelvic tilts c/ diaphragmatic breathing : 20 x 10'' hold  PPT c/ clamshell , BTB: 2 x 10 reps   Bridges c/ BlueTB: 3 x 10 reps , 10'' hold   Palloff press: 20lbs : 2 x 10 reps B    Pt received MHP to lumbar spine for 10' following session.       Patient Education and Home Exercises     Home Exercises Provided and Patient Education Provided     Education provided:   - Continue HEP provided at eval and new HEP provided today - reviewed in full with good pt  understanding     Written Home Exercises Provided: yes. Exercises were reviewed and Jean was able to demonstrate them prior to the end of the session.  Jean demonstrated good  understanding of the education provided. See EMR under Patient Instructions for exercises provided during therapy sessions    ASSESSMENT     Pt presents with a flare up and increased pain after twisting this weekend . Session focused on pain free mobility and core stabilization which he responded well too. Will continue to progress and return to PLOF as symptoms allow.     Jean Is progressing well towards his goals.   Pt prognosis is Good.     Pt will continue to benefit from skilled outpatient physical therapy to address the deficits listed in the problem list box on initial evaluation, provide pt/family education and to maximize pt's level of independence in the home and community environment.   Pt's spiritual, cultural and educational needs considered and pt agreeable to plan of care and goals.     Anticipated barriers to physical therapy: none    Goals:  Short Term Goals: 3 weeks   1. In 3 weeks, pt will be able to perform 10 pelvic tilts with no cueing   2. In 3 weeks, pt will report 0/10 pain at rest   3. In 3 weeks, pt will report 20% Improvement in activity tolerance      Long Term Goals: 6 weeks   1. In 6 weeks, pt will be independent with HEP   2. In 6 weeks, pt will have 5/5 MMTs   3. In 6 weeks, pt will report 40% improvement in returning to exercise     PLAN     Continue to progress core and hip strengthening.     Bhavna Lowry, PTA

## 2022-08-11 ENCOUNTER — CLINICAL SUPPORT (OUTPATIENT)
Dept: REHABILITATION | Facility: HOSPITAL | Age: 47
End: 2022-08-11
Payer: COMMERCIAL

## 2022-08-11 DIAGNOSIS — M54.41 CHRONIC BILATERAL LOW BACK PAIN WITH BILATERAL SCIATICA: Primary | ICD-10-CM

## 2022-08-11 DIAGNOSIS — C92.00 CORE BINDING FACTOR ACUTE MYELOID LEUKEMIA (AML): ICD-10-CM

## 2022-08-11 DIAGNOSIS — G89.29 CHRONIC BILATERAL LOW BACK PAIN WITH BILATERAL SCIATICA: Primary | ICD-10-CM

## 2022-08-11 DIAGNOSIS — M54.42 CHRONIC BILATERAL LOW BACK PAIN WITH BILATERAL SCIATICA: Primary | ICD-10-CM

## 2022-08-11 PROCEDURE — 97110 THERAPEUTIC EXERCISES: CPT | Mod: CQ

## 2022-08-16 ENCOUNTER — CLINICAL SUPPORT (OUTPATIENT)
Dept: REHABILITATION | Facility: HOSPITAL | Age: 47
End: 2022-08-16
Payer: COMMERCIAL

## 2022-08-16 DIAGNOSIS — M54.42 CHRONIC BILATERAL LOW BACK PAIN WITH BILATERAL SCIATICA: Primary | ICD-10-CM

## 2022-08-16 DIAGNOSIS — C92.00 CORE BINDING FACTOR ACUTE MYELOID LEUKEMIA (AML): ICD-10-CM

## 2022-08-16 DIAGNOSIS — G89.29 CHRONIC BILATERAL LOW BACK PAIN WITH BILATERAL SCIATICA: Primary | ICD-10-CM

## 2022-08-16 DIAGNOSIS — M54.41 CHRONIC BILATERAL LOW BACK PAIN WITH BILATERAL SCIATICA: Primary | ICD-10-CM

## 2022-08-16 PROCEDURE — 97140 MANUAL THERAPY 1/> REGIONS: CPT

## 2022-08-16 PROCEDURE — 97110 THERAPEUTIC EXERCISES: CPT

## 2022-08-16 NOTE — PROGRESS NOTES
[facility]   Physical Therapy Treatment Note     Name: Jean Martinez  Clinic Number: [id number]    Therapy Diagnosis:   Encounter Diagnoses   Name Primary?   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)      Physician: Konstantin Sheldon MD    Visit Date: 8/16/2022    Physician Orders: PT Eval and Treat   Medical Diagnosis from Referral: M47.816 (ICD-10-CM) - Lumbar spondylosis   Evaluation Date: 7/12/2022  Authorization Period Expiration: 12/31/2022  Plan of Care Expiration: 8/26/2022  Progress Note Due: 8/5/2022  Visit # / Visits authorized: 7/ 20  FOTO: 1/3    PTA Visit #: 0/5     Time In: 10:14am  Time Out: 11:00am  Total Treatment Time: 44 minutes   Total Billable Time: 44 minutes    Precautions: Standard   SUBJECTIVE     Pt reports: He did more yard work and it was ok, about 3/10 pain today   He was compliant with home exercise program.  Response to previous treatment: no adverse effects   Functional change: none    Pain: 3/10  Location: B lumbar spine     OBJECTIVE      Palpation Assessment to determine the necessity for Functional Dry Needling;  Bilateral lumbar paraspinals.       Home Exercises and Patient Education Provided       Treatment     Jean received the treatments listed below:      therapeutic exercises to develop strength, endurance, ROM, flexibility, posture and core stabilization for 44 minutes including:    Recumbent Bike: 7' L 4  With hot pack:   Piriformis stretch : 3 x 30''  ( pushing away on LLE)   SKTC stretch : 3 x 30'' B  LTRs 3 min           Not today:   Posterior pelvic tilts c/ diaphragmatic breathing : 20 x 10'' hold  PPT c/ clamshell , BTB: 2 x 10 reps   Bridges c/ BlueTB: 3 x 10 reps , 10'' hold   Palloff press: 20lbs : 2 x 10 reps B    Manual Therapy: Functional Dry needling to B lumbar paraspinals with good tolerance     Pt received MHP to lumbar spine for 10' following session.        Patient Education and Home Exercises     Home Exercises Provided and Patient Education Provided     Education provided:   - Continue HEP provided at [facility] and new HEP provided today - reviewed in full with good pt understanding     Written Home Exercises Provided: yes. Exercises were reviewed and Jean was able to demonstrate them prior to the end of the session.  Jean demonstrated good  understanding of the education provided. See EMR under Patient Instructions for exercises provided during therapy sessions    ASSESSMENT     Patient demonstrated appropriate response to Functional Dry Needling; good  rhythmical contractions observed without estim to treated muscle groups. Pt signed consent form found in media section. Pt reported significant decreases in pain. Will continue with core and back stability next visit       Jean Is progressing well towards his goals.   Pt prognosis is Good.     Pt will continue to benefit from skilled outpatient physical therapy to address the deficits listed in the problem list box on initial evaluation, provide pt/family education and to maximize pt's level of independence in the home and community environment.   Pt's spiritual, cultural and educational needs considered and pt agreeable to plan of care and goals.     Anticipated barriers to physical therapy: none    Goals:  Short Term Goals: 3 weeks   1. In 3 weeks, pt will be able to perform 10 pelvic tilts with no cueing   2. In 3 weeks, pt will report 0/10 pain at rest   3. In 3 weeks, pt will report 20% Improvement in activity tolerance      Long Term Goals: 6 weeks   1. In 6 weeks, pt will be independent with HEP   2. In 6 weeks, pt will have 5/5 MMTs   3. In 6 weeks, pt will report 40% improvement in returning to exercise     PLAN     Continue to progress core and hip strengthening.     Mag Menchaca, PT

## 2022-08-17 NOTE — PROGRESS NOTES
[facility]   Physical Therapy Treatment Note     Name: Jean Martinez  Clinic Number: [id number]    Therapy Diagnosis:   Encounter Diagnoses   Name Primary?   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)      Physician: Konstantin Sheldon MD    Visit Date: 8/18/2022    Physician Orders: PT Eval and Treat   Medical Diagnosis from Referral: M47.816 (ICD-10-CM) - Lumbar spondylosis   Evaluation Date: 7/12/2022  Authorization Period Expiration: 12/31/2022  Plan of Care Expiration: 8/26/2022  Progress Note Due: 8/5/2022  Visit # / Visits authorized: 8/ 20  FOTO: 1/3    PTA Visit #: 1/5     Time In: 9:10 am ( pt late arrival )   Time Out: 10:00 am  Total Treatment Time: 50 minutes   Total Billable Time: 50 minutes    Precautions: Standard   SUBJECTIVE     Pt reports: he is doing better since dry needling and is not having much pain.   He was compliant with home exercise program.  Response to previous treatment: no adverse effects   Functional change: none    Pain: >1/10  Location: B lumbar spine     OBJECTIVE        Home Exercises and Patient Education Provided       Treatment     Jean received the treatments listed below:      therapeutic exercises to develop strength, endurance, ROM, flexibility, posture and core stabilization for 44 minutes including:    Recumbent Bike: 7' L 4  LRRs small ROM: 2'   TrA march c/ BTB: 2 x 10 reps   Bridges c/ BlueTB: 3 x 10 reps , 10'' hold   Sl hip abduction : 2 x 10 reps B   Palloff press: 20lbs : 2 x 10 reps B  Quadruped bird dogs : 2 x 10 reps   Hip hinging (cane for cueing) : 2 x 10 reps       Patient Education and Home Exercises     Home Exercises Provided and Patient Education Provided     Education provided:   - Continue HEP provided at eval and new HEP provided today - reviewed in full with good pt understanding     Written Home Exercises Provided: yes. Exercises were reviewed and Jean was  able to demonstrate them prior to the end of the session.  Jean demonstrated good  understanding of the education provided. See EMR under Patient Instructions for exercises provided during therapy sessions    ASSESSMENT     Pt presents with decreased pain and tightness with good response to FDN last session. Able to re incorporate strengthening activities performed prior to pts flare up which pt was able to complete with no adverse effects . Good core and lumbopelvic stability noted with above activities . Will continue with core and back stability next visit     Jean Is progressing well towards his goals.   Pt prognosis is Good.     Pt will continue to benefit from skilled outpatient physical therapy to address the deficits listed in the problem list box on initial evaluation, provide pt/family education and to maximize pt's level of independence in the home and community environment.   Pt's spiritual, cultural and educational needs considered and pt agreeable to plan of care and goals.     Anticipated barriers to physical therapy: none    Goals:  Short Term Goals: 3 weeks   1. In 3 weeks, pt will be able to perform 10 pelvic tilts with no cueing   2. In 3 weeks, pt will report 0/10 pain at rest   3. In 3 weeks, pt will report 20% Improvement in activity tolerance      Long Term Goals: 6 weeks   1. In 6 weeks, pt will be independent with HEP   2. In 6 weeks, pt will have 5/5 MMTs   3. In 6 weeks, pt will report 40% improvement in returning to exercise     PLAN     Continue to progress core and hip strengthening.     Bhavna Lowry, PTA

## 2022-08-18 ENCOUNTER — CLINICAL SUPPORT (OUTPATIENT)
Dept: REHABILITATION | Facility: HOSPITAL | Age: 47
End: 2022-08-18
Payer: COMMERCIAL

## 2022-08-18 DIAGNOSIS — M54.42 CHRONIC BILATERAL LOW BACK PAIN WITH BILATERAL SCIATICA: Primary | ICD-10-CM

## 2022-08-18 DIAGNOSIS — C92.00 CORE BINDING FACTOR ACUTE MYELOID LEUKEMIA (AML): ICD-10-CM

## 2022-08-18 DIAGNOSIS — M54.41 CHRONIC BILATERAL LOW BACK PAIN WITH BILATERAL SCIATICA: Primary | ICD-10-CM

## 2022-08-18 DIAGNOSIS — G89.29 CHRONIC BILATERAL LOW BACK PAIN WITH BILATERAL SCIATICA: Primary | ICD-10-CM

## 2022-08-18 PROCEDURE — 97110 THERAPEUTIC EXERCISES: CPT | Mod: CQ

## 2022-08-25 ENCOUNTER — CLINICAL SUPPORT (OUTPATIENT)
Dept: REHABILITATION | Facility: HOSPITAL | Age: 47
End: 2022-08-25
Payer: COMMERCIAL

## 2022-08-25 DIAGNOSIS — M54.42 CHRONIC BILATERAL LOW BACK PAIN WITH BILATERAL SCIATICA: Primary | ICD-10-CM

## 2022-08-25 DIAGNOSIS — C92.00 CORE BINDING FACTOR ACUTE MYELOID LEUKEMIA (AML): ICD-10-CM

## 2022-08-25 DIAGNOSIS — G89.29 CHRONIC BILATERAL LOW BACK PAIN WITH BILATERAL SCIATICA: Primary | ICD-10-CM

## 2022-08-25 DIAGNOSIS — M54.41 CHRONIC BILATERAL LOW BACK PAIN WITH BILATERAL SCIATICA: Primary | ICD-10-CM

## 2022-08-25 PROCEDURE — 97110 THERAPEUTIC EXERCISES: CPT

## 2022-08-25 NOTE — PROGRESS NOTES
[facility]   Physical Therapy Treatment Note     Name: Jean Martinez  Clinic Number: [id number]    Therapy Diagnosis:   Encounter Diagnoses   Name Primary?   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)      Physician: Konstantin Sheldon MD    Visit Date: 8/25/2022    Physician Orders: PT Eval and Treat   Medical Diagnosis from Referral: M47.816 (ICD-10-CM) - Lumbar spondylosis   Evaluation Date: 7/12/2022  Authorization Period Expiration: 12/31/2022  Plan of Care Expiration: 8/26/2022  Progress Note Due: 8/5/2022  Visit # / Visits authorized: 10/ 20  FOTO: 1/3    PTA Visit #: 0/5     Time In: 10:08am   Time Out: 11:00 am  Total Treatment Time: 52 minutes   Total Billable Time: 52 minutes    Precautions: Standard   SUBJECTIVE     Pt reports: He is having no pain, has been having a busy time at work but hasn't had a flare up like the one before   He was compliant with home exercise program.  Response to previous treatment: no adverse effects   Functional change: none    Pain: 0/10  Location: B lumbar spine     OBJECTIVE        Home Exercises and Patient Education Provided       Treatment     Jean received the treatments listed below:      therapeutic exercises to develop strength, endurance, ROM, flexibility, posture and core stabilization for 44 minutes including:    Recumbent Bike: 7' L 4  LRRs small ROM: 2'   TrA march c/ BTB: 2 x 10 reps   Bridges c/ BlueTB: 3 x 10 reps , 10'' hold   Sl hip abduction : 2 x 10 reps B   Palloff press: 20lbs : 2 x 10 reps B  Quadruped bird dogs : 2 x 10 reps   Hip hinging (cane for cueing) : 2 x 10 reps    RDL   12 # KB 2x10   Hip hinges with cane 2x10   Hip hinges with padded TB 2x10    Side Planks x4 15'       Patient Education and Home Exercises     Home Exercises Provided and Patient Education Provided     Education provided:   - Continue [facility]P provided at [facility] and new HEP provided today -  reviewed in full with good pt understanding     Written Home Exercises Provided: yes. Exercises were reviewed and Jean was able to demonstrate them prior to the end of the session.  Jean demonstrated good  understanding of the education provided. See EMR under Patient Instructions for exercises provided during therapy sessions    ASSESSMENT     Pt tolerated treatment well with focus on core stability and improving lifting form. Pt continues to have no pain. Will progress as tolerated     Jean Is progressing well towards his goals.   Pt prognosis is Good.     Pt will continue to benefit from skilled outpatient physical therapy to address the deficits listed in the problem list box on initial evaluation, provide pt/family education and to maximize pt's level of independence in the home and community environment.   Pt's spiritual, cultural and educational needs considered and pt agreeable to plan of care and goals.     Anticipated barriers to physical therapy: none    Goals:  Short Term Goals: 3 weeks   1. In 3 weeks, pt will be able to perform 10 pelvic tilts with no cueing   2. In 3 weeks, pt will report 0/10 pain at rest   3. In 3 weeks, pt will report 20% Improvement in activity tolerance      Long Term Goals: 6 weeks   1. In 6 weeks, pt will be independent with HEP   2. In 6 weeks, pt will have 5/5 MMTs   3. In 6 weeks, pt will report 40% improvement in returning to exercise     PLAN     Continue to progress core and hip strengthening.     Mag Menchaca, PT

## 2022-08-31 ENCOUNTER — CLINICAL SUPPORT (OUTPATIENT)
Dept: REHABILITATION | Facility: HOSPITAL | Age: 47
End: 2022-08-31
Payer: COMMERCIAL

## 2022-08-31 DIAGNOSIS — G89.29 CHRONIC BILATERAL LOW BACK PAIN WITH BILATERAL SCIATICA: Primary | ICD-10-CM

## 2022-08-31 DIAGNOSIS — M54.42 CHRONIC BILATERAL LOW BACK PAIN WITH BILATERAL SCIATICA: Primary | ICD-10-CM

## 2022-08-31 DIAGNOSIS — C92.00 CORE BINDING FACTOR ACUTE MYELOID LEUKEMIA (AML): ICD-10-CM

## 2022-08-31 DIAGNOSIS — M54.41 CHRONIC BILATERAL LOW BACK PAIN WITH BILATERAL SCIATICA: Primary | ICD-10-CM

## 2022-08-31 PROCEDURE — 97110 THERAPEUTIC EXERCISES: CPT

## 2022-08-31 NOTE — PROGRESS NOTES
[facility] OUTPATIENT THERAPY AND WELLNESS   Physical Therapy Treatment Note/ Discharge     Name: Jean Martinez  Clinic Number: [id number]    Therapy Diagnosis:   Encounter Diagnoses   Name Primary?    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)        Physician: Konstantin Sheldon MD    Visit Date: 8/31/2022    Physician Orders: PT Eval and Treat   Medical Diagnosis from Referral: M47.816 (ICD-10-CM) - Lumbar spondylosis   Evaluation Date: 7/12/2022  Authorization Period Expiration: 12/31/2022  Plan of Care Expiration: 8/26/2022  Progress Note Due: 8/5/2022  Visit # / Visits authorized: 11/ 20  FOTO: 1/3    PTA Visit #: 0/5     Time In: 2:00pm   Time Out: 2:35pm   Total Treatment Time: 35 minutes   Total Billable Time: 35 minutes    Precautions: Standard   SUBJECTIVE     Pt reports: He went on the boat this weekend with no issues   He was compliant with home exercise program.  Response to previous treatment: no adverse effects   Functional change: none    Pain: 0/10  Location: B lumbar spine     OBJECTIVE        Lumbar Range of Motion: WNL with no pain   Lower Extremity Strength   Right LE   Left LE     Knee extension: 5/5 Knee extension: 5/5   Knee flexion: 5/5 Knee flexion: 5/5   Hip flexion: 5/5 5/5 5/5   Hip extension:  5/5 Hip extension: 5/5   Hip abduction: 5/5 Hip abduction: 5/5   Hip adduction: 5/5 Hip adduction 5/5   Ankle dorsiflexion: 5/5 Ankle dorsiflexion: 5/5   Ankle plantarflexion: 5/5 Ankle plantarflexion: 5/5            Special Tests:  -Bridge Test: no pain                 Treatment     Jean received the treatments listed below:      therapeutic exercises to develop strength, endurance, ROM, flexibility, posture and core stabilization for 35 minutes including:    Piriformis stretch : 3 x 30''  ( pushing away on LLE)   SKTC stretch : 3 x 30'' B  Posterior pelvic tilts c/ diaphragmatic breathing : 20 x 10'' hold  Recumbent Bike: 7' L 4  LRRs  small ROM: 2'   TrA march c/ BTB: 2 x 10 reps   Bridges c/ BlueTB: 3 x 10 reps , 10'' hold   Palloff press: 20lbs :x10   Squats 35# x10     Patient Education and Home Exercises     Home Exercises Provided and Patient Education Provided     Education provided:   - Continue HEP provided at [facility] and new HEP provided today - reviewed in full with good pt understanding     Written Home Exercises Provided: yes. Exercises were reviewed and Jean was able to demonstrate them prior to the end of the session.  Jean demonstrated good  understanding of the education provided. See EMR under Patient Instructions for exercises provided during therapy sessions    ASSESSMENT     Pt shows excellent progress and reports no pain. Pt report at least 40% return to exercise. Pt has met all of his goals and is ready for discharge. Pt was given a HEP and reviewed it with therapist to continue maintaining activity level and ROM at home. Pt was given clinic contact info and encouraged to reach out with any questions or concerns.      Jean Is progressing well towards his goals.   Pt prognosis is Good.     Pt will continue to benefit from skilled outpatient physical therapy to address the deficits listed in the problem list box on initial evaluation, provide pt/family education and to maximize pt's level of independence in the home and community environment.   Pt's spiritual, cultural and educational needs considered and pt agreeable to plan of care and goals.     Anticipated barriers to physical therapy: none    Goals:  Short Term Goals: 3 weeks   1. In 3 weeks, pt will be able to perform 10 pelvic tilts with no cueing MET   2. In 3 weeks, pt will report 0/10 pain at rest MET   3. In 3 weeks, pt will report 20% Improvement in activity tolerance MET      Long Term Goals: 6 weeks   1. In 6 weeks, pt will be independent with HEP MET   2. In 6 weeks, pt will have 5/5 MMTs MET   3. In 6 weeks, pt will report 40% improvement in returning to exercise  MET     PLAN     Continue to progress core and hip strengthening.     Mag Menchaca, PT

## 2022-08-31 NOTE — PROGRESS NOTES
Subjective:     Jean Martinez    Chief Complaint   Patient presents with    Follow-up         HPI      Jean is a 47 y.o. male coming in today for low back pain. Since last visit the pain has Improved. Pt reports PT is going well, but he did have a flare up when twisting while mowing the lawn. He had excellent relief with dry needling. He was d/c from PT yesterday. He is concerned about possible future flare ups. The pain is better with rest, PT and worse with activity, twisting. Pt reports he never took the gabapentin consistently. Not taking meloxicam. Pt. describes the pain as a 1/10 achy pain that does not radiate currently. There has not been any new a fall/injury/ or traumas since last visit. Reports tightness and pain in L groin, PT notes poss impingement. Pt. denies any new musculoskeletal complaints at this time.     Office note from 6/30/22 reviewed    PAST MEDICAL HISTORY:   Past Medical History:   Diagnosis Date    Hypertension      PAST SURGICAL HISTORY:   Past Surgical History:   Procedure Laterality Date    KNEE SURGERY      SHOULDER SURGERY       FAMILY HISTORY:   Family History   Problem Relation Age of Onset    No Known Problems Mother     No Known Problems Father      SOCIAL HISTORY:   Social History     Socioeconomic History    Marital status:    Tobacco Use    Smoking status: Never    Smokeless tobacco: Never       MEDICATIONS:   Current Outpatient Medications:     cyclobenzaprine (FLEXERIL) 10 MG tablet, Take 1 tablet (10 mg total) by mouth 3 (three) times daily as needed for Muscle spasms., Disp: 60 tablet, Rfl: 1    gabapentin (NEURONTIN) 300 MG capsule, Take 1 capsule (300 mg total) by mouth 3 (three) times daily., Disp: 60 capsule, Rfl: 1    lansoprazole (PREVACID) 30 MG capsule, Take 1 capsule (30 mg total) by mouth once daily., Disp: 30 capsule, Rfl: 5    meloxicam (MOBIC) 15 MG tablet, Take 1 tablet (15 mg total) by mouth once daily., Disp: 60 tablet, Rfl: 1    valsartan (DIOVAN)  320 MG tablet, Take 1 tablet (320 mg total) by mouth once daily., Disp: 90 tablet, Rfl: 3  ALLERGIES: Review of patient's allergies indicates:  No Known Allergies    Reviewed outside records from [facility] from 2015:  - MRI  images of lumbar spine taken 12/1/15 showed degenerative changes lumbar spine, with loss of disc height at L2-3 in L5-S1. Images personally reviewed.     Objective:     VITAL SIGNS: BP (!) 160/100     General    Vitals reviewed.  Constitutional: He is oriented to person, place, and time. He appears well-developed and well-nourished.   Neurological: He is alert and oriented to person, place, and time.   Psychiatric: He has a normal mood and affect. His behavior is normal.               MUSCULOSKELETAL EXAM:     Lumbar Spine: left lumbar region    Observation:    Posture:  Posterior pelvis tilt with loss of lumbar lordosis  No obvious pelvic obliquity while standing.    No edema, erythema, or ecchymosis noted in lumbosacral region.    No midline skin abnormalities.    No atrophy of lower limb musculature.  Leg lengths symmetric following OMT to the pelvis.  Gait: Non-antalgic     Tenderness:  No tenderness throughout the lumbar spine, iliolumbar region, posterior pelvis.  No tenderness over the sacrum, piriformis, greater/lesser trochanters.  No bony deformities or step-offs palpated.     Range of Motion:  Active flexion to 60°.  Active extension to 25°.   Active rotation to 30° on left and 30° on right.  Active sidebending to 25° on left and 25° on right.   Passive hip flexion to 135° on left and 135° on right.    Passive hip internal rotation to 35° on left* and 45° on right.   Passive hip external rotation to 45° on left* and 45° on right.     Strength Testing (* = with pain):  Hip flexion - 5/5 on left and 5/5 on right  Hip extension - 5/5 on left and 5/5 on right  Knee flexion - 5/5 on left and 5/5 on right  Knee extension - 5/5 on left and 5/5 on right  Dorsiflexion - 5/5 on  left and 5/5 on right  Plantarflexion - 5/5 on left and 5/5 on right  Great toe extension - 5/5 on left and 5/5 on right    Special Tests:    Seated straight leg raise - negative on left and negative on right  Supine straight leg raise - negative on left and negative on right  Slump test - negative on left and negative on right  Provocation maneuvers exhibit no worsening of symptoms.  No tenderness with L5-S1 disc compression     DIDI test - positive  FADIR test - positive  Log roll test - negative    Structural Exam:  TART (Tissue texture abnormality, Asymmetry,  Restriction of motion and/or Tenderness) changes:     Lumbar Spine   L1 Neutral   L2 Neutral   L3 Neutral   L4 FRS RIGHT   L5 FRS RIGHT     Pelvis:  Innominate:Right anterior rotation  Pubic bone:Right inferior pubic shear     Sacrum:Right unilateral, left on right torsion     Key   F= Flexed   E = Extended   R = Rotated   S = Sidebent   TTA = tissue texture abnormality       Neurovascular Exam:  Reflexes +2/4 and symmetric at the L4 and S1 dermatomes.    Sensation intact to light touch in the L2-S2 dermatomes bilaterally.   No pretibial edema or abnormal hair pattern of the shin.    Intact and symmetric DP and PT pulses bilaterally.    IMAGIN. X-ray ordered due to left hip pain. (AP pelvis and frogleg lateral  left views) taken today.   2. X-ray images were reviewed personally by me and then directly with patient.  3. FINDINGS: X-ray images obtained demonstrate mild DJD and impingement change of the left hip.  No fracture dislocation bone destruction seen.  4. IMPRESSION: see above      Assessment:      Encounter Diagnoses   Name Primary?    Left hip pain Yes    Chronic bilateral low back pain with bilateral sciatica     Spinal stenosis of lumbar region without neurogenic claudication     Lumbar spondylosis     DDD (degenerative disc disease), lumbar     Myalgia     Somatic dysfunction of lumbar region     Sacral region somatic dysfunction      Somatic dysfunction of pelvic region             Plan:      1. Chronic low back pain with underlying lumbar spondylosis and DDD. Recent lumbar spine MRI also showed severe spinal is canal stenosis at the L4-5 level. Improved with formal PT, no longer taking NSAIDs or gabapentin.   - OMT performed today to address associated biomechanical restrictions  - Emphasized importance of maintaining HEP now that he has completed PT.   - continue to work on proper posture practices  - continue lumbar spine back brace as needed  - MRI  images of lumbar spine taken 12/1/15 showed degenerative changes lumbar spine,  with loss of disc height at L2-3 in L5-S1.  Images personally reviewed.  - X-ray images of lumbar spine taken today (AP, lateral, bilateral obliques, L5-S1 joint, flexion and extension views) showed no acute abnormality.  Degenerative changes in lumbar spine with stable L2-3 grade 1 retrolisthesis.  Images were personally reviewed with patient.  - - MRI  images of lumbar spine taken 6/28/22 showed Multilevel degenerative changes of the lumbar spine most pronounced at L4-L5 noting severe spinal canal stenosis and moderate neural foraminal narrowing. Images were personally reviewed with patient    2. New left groin pain on exam today, exacerbated by piriformis stretch from PT, likely secondary to mild DJD and impingement changes on today's x-ray.   - OMT performed today to address associated biomechanical restrictions  and HEP started.   - Modified piriformis stretch  to the seated position  - recommend meloxicam 15 mg p.o. q.day as needed for pain control as well as ice of 20 minutes at time  - discussed option of diagnostic/therapeutic ultrasound-guided left hip intra-articular CSI as next step if symptoms deteriorate  -  X-ray images of left hip taken today (AP pelvis and frogleg lateral  left views) showed mild DJD and impingement change of the left hip.  No fracture dislocation bone destruction seen. Images were  personally reviewed with patient.    3.  Elevated BP reading at today's visit. Pt. Did not take their BP medication today. Stressed importance of compliance with BP medication.  If BP remains elevated with regular BP medication use,  Recommend follow-up with PCP, Dr. Nguyen, for further management.    4. OMT 3-4 regions. Oral consent obtained.  Reviewed benefits and potential side effects.   - OMT indicated today due to signs and symptoms as well as local and remote somatic dysfunction findings and their related neurokinetic, lymphatic, fascial and/or arteriovenous body connections.   - OMT techniques used: Myofascial Release and Muscle Energy   - Treatment was tolerated well. Improvement noted in segmental mobility post-treatment in dysfunctional regions. There were no adverse events and no complications immediately following treatment.     5. Follow-up as needed if pain deteriorates or new issue arises    6. Patient agreeable to today's plan and all questions were answered    This note is dictated using the M*Modal Fluency Direct word recognition program. There are word recognition mistakes that are occasionally missed on review.      Total time spent face-to face with patient counseling or coordinating care including prognosis, differential diagnosis, risks and benefits of treatment, instructions, compliance risk reductions as well as non-face-to-face time personally spent reviewing medial record, medical documentation, and coordination of care.     EST MINUTES X   [id number] 10-19    [id number] 20-29    [id number] 30-39    [id number] 40-54 x   NEW     [id number] 15-29    [id number] 30-44    [id number] 45-59    [id number] 60-74    PHONE      5-10    [id number] 11-20    [id number] 21-30

## 2022-09-01 ENCOUNTER — OFFICE VISIT (OUTPATIENT)
Dept: SPORTS MEDICINE | Facility: CLINIC | Age: 47
End: 2022-09-01
Payer: COMMERCIAL

## 2022-09-01 ENCOUNTER — HOSPITAL ENCOUNTER (OUTPATIENT)
Dept: RADIOLOGY | Facility: HOSPITAL | Age: 47
Discharge: HOME | End: 2022-09-01
Attending: NEUROMUSCULOSKELETAL MEDICINE & OMM
Payer: COMMERCIAL

## 2022-09-01 VITALS — SYSTOLIC BLOOD PRESSURE: 160 MMHG | DIASTOLIC BLOOD PRESSURE: 100 MMHG

## 2022-09-01 DIAGNOSIS — G89.29 CHRONIC BILATERAL LOW BACK PAIN WITH BILATERAL SCIATICA: ICD-10-CM

## 2022-09-01 DIAGNOSIS — M25.552 LEFT HIP PAIN: ICD-10-CM

## 2022-09-01 DIAGNOSIS — M47.816 LUMBAR SPONDYLOSIS: ICD-10-CM

## 2022-09-01 DIAGNOSIS — M99.04 SACRAL REGION SOMATIC DYSFUNCTION: ICD-10-CM

## 2022-09-01 DIAGNOSIS — M79.10 MYALGIA: ICD-10-CM

## 2022-09-01 DIAGNOSIS — M48.061 SPINAL STENOSIS OF LUMBAR REGION WITHOUT NEUROGENIC CLAUDICATION: ICD-10-CM

## 2022-09-01 DIAGNOSIS — M51.36 DDD (DEGENERATIVE DISC DISEASE), LUMBAR: ICD-10-CM

## 2022-09-01 DIAGNOSIS — M99.05 SOMATIC DYSFUNCTION OF PELVIC REGION: ICD-10-CM

## 2022-09-01 DIAGNOSIS — M25.552 LEFT HIP PAIN: Primary | ICD-10-CM

## 2022-09-01 DIAGNOSIS — M54.42 CHRONIC BILATERAL LOW BACK PAIN WITH BILATERAL SCIATICA: ICD-10-CM

## 2022-09-01 DIAGNOSIS — M54.41 CHRONIC BILATERAL LOW BACK PAIN WITH BILATERAL SCIATICA: ICD-10-CM

## 2022-09-01 DIAGNOSIS — I10 ELEVATED BLOOD PRESSURE READING IN OFFICE WITH DIAGNOSIS OF HYPERTENSION: ICD-10-CM

## 2022-09-01 DIAGNOSIS — M99.03 SOMATIC DYSFUNCTION OF LUMBAR REGION: ICD-10-CM

## 2022-09-01 PROCEDURE — 99999 PR PBB SHADOW E&M-EST. PATIENT-LVL III: CPT | Mod: PBBFAC,,, | Performed by: NEUROMUSCULOSKELETAL MEDICINE & OMM

## 2022-09-01 PROCEDURE — 99215 OFFICE O/P EST HI 40 MIN: CPT | Mod: 25,S$GLB,, | Performed by: NEUROMUSCULOSKELETAL MEDICINE & OMM

## 2022-09-01 PROCEDURE — 3080F PR MOST RECENT DIASTOLIC BLOOD PRESSURE >= 90 MM HG: ICD-10-PCS | Mod: CPTII,S$GLB,, | Performed by: NEUROMUSCULOSKELETAL MEDICINE & OMM

## 2022-09-01 PROCEDURE — 98926 PR OSTEOPATHIC MANIP,3-4 BODY REGN: ICD-10-PCS | Mod: S$GLB,,, | Performed by: NEUROMUSCULOSKELETAL MEDICINE & OMM

## 2022-09-01 PROCEDURE — 1159F PR MEDICATION LIST DOCUMENTED IN MEDICAL RECORD: ICD-10-PCS | Mod: CPTII,S$GLB,, | Performed by: NEUROMUSCULOSKELETAL MEDICINE & OMM

## 2022-09-01 PROCEDURE — 1160F RVW MEDS BY RX/DR IN RCRD: CPT | Mod: CPTII,S$GLB,, | Performed by: NEUROMUSCULOSKELETAL MEDICINE & OMM

## 2022-09-01 PROCEDURE — 3077F SYST BP >= 140 MM HG: CPT | Mod: CPTII,S$GLB,, | Performed by: NEUROMUSCULOSKELETAL MEDICINE & OMM

## 2022-09-01 PROCEDURE — 73502 X-RAY EXAM HIP UNI 2-3 VIEWS: CPT | Mod: TC,PO,LT

## 2022-09-01 PROCEDURE — 73502 XR HIP WITH PELVIS WHEN PERFORMED, 2 OR 3 VIEWS LEFT: ICD-10-PCS | Mod: 26,LT,, | Performed by: RADIOLOGY

## 2022-09-01 PROCEDURE — 3080F DIAST BP >= 90 MM HG: CPT | Mod: CPTII,S$GLB,, | Performed by: NEUROMUSCULOSKELETAL MEDICINE & OMM

## 2022-09-01 PROCEDURE — 1159F MED LIST DOCD IN RCRD: CPT | Mod: CPTII,S$GLB,, | Performed by: NEUROMUSCULOSKELETAL MEDICINE & OMM

## 2022-09-01 PROCEDURE — 3077F PR MOST RECENT SYSTOLIC BLOOD PRESSURE >= 140 MM HG: ICD-10-PCS | Mod: CPTII,S$GLB,, | Performed by: NEUROMUSCULOSKELETAL MEDICINE & OMM

## 2022-09-01 PROCEDURE — 98926 OSTEOPATH MANJ 3-4 REGIONS: CPT | Mod: S$GLB,,, | Performed by: NEUROMUSCULOSKELETAL MEDICINE & OMM

## 2022-09-01 PROCEDURE — 99215 PR OFFICE/OUTPT VISIT, EST, LEVL V, 40-54 MIN: ICD-10-PCS | Mod: 25,S$GLB,, | Performed by: NEUROMUSCULOSKELETAL MEDICINE & OMM

## 2022-09-01 PROCEDURE — 73502 X-RAY EXAM HIP UNI 2-3 VIEWS: CPT | Mod: 26,LT,, | Performed by: RADIOLOGY

## 2022-09-01 PROCEDURE — 4010F PR ACE/ARB THEARPY RXD/TAKEN: ICD-10-PCS | Mod: CPTII,S$GLB,, | Performed by: NEUROMUSCULOSKELETAL MEDICINE & OMM

## 2022-09-01 PROCEDURE — 99999 PR PBB SHADOW E&M-EST. PATIENT-LVL III: ICD-10-PCS | Mod: PBBFAC,,, | Performed by: NEUROMUSCULOSKELETAL MEDICINE & OMM

## 2022-09-01 PROCEDURE — 4010F ACE/ARB THERAPY RXD/TAKEN: CPT | Mod: CPTII,S$GLB,, | Performed by: NEUROMUSCULOSKELETAL MEDICINE & OMM

## 2022-09-01 PROCEDURE — 1160F PR REVIEW ALL MEDS BY PRESCRIBER/CLIN PHARMACIST DOCUMENTED: ICD-10-PCS | Mod: CPTII,S$GLB,, | Performed by: NEUROMUSCULOSKELETAL MEDICINE & OMM

## 2022-09-18 ENCOUNTER — HOSPITAL ENCOUNTER (EMERGENCY)
Facility: OTHER | Age: 47
Discharge: HOME | End: 2022-09-18
Attending: EMERGENCY MEDICINE
Payer: COMMERCIAL

## 2022-09-18 VITALS
BODY MASS INDEX: 33.37 KG/M2 | RESPIRATION RATE: 16 BRPM | HEIGHT: 74 IN | HEART RATE: 71 BPM | WEIGHT: 260 LBS | OXYGEN SATURATION: 98 % | SYSTOLIC BLOOD PRESSURE: 177 MMHG | TEMPERATURE: 98 F | DIASTOLIC BLOOD PRESSURE: 104 MMHG

## 2022-09-18 DIAGNOSIS — T14.8XXA PUNCTURE WOUND: ICD-10-CM

## 2022-09-18 DIAGNOSIS — S09.93XA FISH HOOK INJURY OF CHEEK, INITIAL ENCOUNTER: Primary | ICD-10-CM

## 2022-09-18 DIAGNOSIS — M79.5 FOREIGN BODY (FB) IN SOFT TISSUE: ICD-10-CM

## 2022-09-18 PROCEDURE — 96372 THER/PROPH/DIAG INJ SC/IM: CPT

## 2022-09-18 PROCEDURE — 25000003 PHARM REV CODE 250

## 2022-09-18 PROCEDURE — 90715 TDAP VACCINE 7 YRS/> IM: CPT

## 2022-09-18 PROCEDURE — 63600175 PHARM REV CODE 636 W HCPCS

## 2022-09-18 PROCEDURE — 10120 INC&RMVL FB SUBQ TISS SMPL: CPT

## 2022-09-18 PROCEDURE — 90471 IMMUNIZATION ADMIN: CPT

## 2022-09-18 PROCEDURE — 99284 EMERGENCY DEPT VISIT MOD MDM: CPT | Mod: 25

## 2022-09-18 RX ORDER — DOXYCYCLINE 100 MG/1
100 CAPSULE ORAL 2 TIMES DAILY
Qty: 14 CAPSULE | Refills: 0 | Status: SHIPPED | OUTPATIENT
Start: 2022-09-18 | End: 2022-09-25

## 2022-09-18 RX ORDER — LIDOCAINE HYDROCHLORIDE 10 MG/ML
5 INJECTION INFILTRATION; PERINEURAL
Status: COMPLETED | OUTPATIENT
Start: 2022-09-18 | End: 2022-09-18

## 2022-09-18 RX ORDER — KETOROLAC TROMETHAMINE 30 MG/ML
30 INJECTION, SOLUTION INTRAMUSCULAR; INTRAVENOUS
Status: COMPLETED | OUTPATIENT
Start: 2022-09-18 | End: 2022-09-18

## 2022-09-18 RX ADMIN — KETOROLAC TROMETHAMINE 30 MG: 30 INJECTION, SOLUTION INTRAMUSCULAR; INTRAVENOUS at 03:09

## 2022-09-18 RX ADMIN — LIDOCAINE HYDROCHLORIDE 5 ML: 10 INJECTION, SOLUTION INFILTRATION; PERINEURAL at 03:09

## 2022-09-18 RX ADMIN — CLOSTRIDIUM TETANI TOXOID ANTIGEN (FORMALDEHYDE INACTIVATED), CORYNEBACTERIUM DIPHTHERIAE TOXOID ANTIGEN (FORMALDEHYDE INACTIVATED), BORDETELLA PERTUSSIS TOXOID ANTIGEN (GLUTARALDEHYDE INACTIVATED), BORDETELLA PERTUSSIS FILAMENTOUS HEMAGGLUTININ ANTIGEN (FORMALDEHYDE INACTIVATED), BORDETELLA PERTUSSIS PERTACTIN ANTIGEN, AND BORDETELLA PERTUSSIS FIMBRIAE 2/3 ANTIGEN 0.5 ML: 5; 2; 2.5; 5; 3; 5 INJECTION, SUSPENSION INTRAMUSCULAR at 03:09

## 2022-09-18 NOTE — ED PROVIDER NOTES
"Source of History:  Patient    Chief complaint:  Foreign Body in Skin (Fish hook to R cheek)      HPI:  Jean Martinez is a 47 y.o. male presenting with fishhook injury to his right cheek.  Patient states that he and his wife were out fishing prior to arrival when his wife went to cast her fishing line and it got caught on the patient's cheek.  Patient arrived with hook imbedded in his cheek.  His wife is at bedside.  Patient states his tetanus is not currently up-to-date. Denies fever, chills, vision changes, numbness, or tingling.    This is the extent to the patients complaints today here in the emergency department.    ROS: As per HPI and below:  General: No fever.  No chills.  Head: No headache.  No loss of consciousness or amnesia.  Neck:  No neck pain  Back:  No back pain  Extremities:  No myalgias or arthralgias.  Respiratory: No shortness of breath.  No chest pain.  Cardiovascular: No palpitations.  Abdomen: No abdominal pain.  No nausea or vomiting.  Integument: No rashes or bruising.  +foreign body (fishhook in right cheek)  Eyes: No visual changes.  Urinary: No hematuria.  Neurologic: No numbness.  No focal weakness.     Review of patient's allergies indicates:  No Known Allergies    PMH:  As per HPI and below:  Past Medical History:   Diagnosis Date    Hypertension      Past Surgical History:   Procedure Laterality Date    KNEE SURGERY      SHOULDER SURGERY         Social History     Tobacco Use    Smoking status: Never    Smokeless tobacco: Never       Physical Exam:    BP (!) 177/104 (BP Location: Right arm, Patient Position: Lying)   Pulse 71   Temp 98.4 °F (36.9 °C) (Oral)   Resp 16   Ht 6' 2" (1.88 m)   Wt 117.9 kg (260 lb)   SpO2 98%   BMI 33.38 kg/m²   Nursing note and vital signs reviewed.  Appearance: No acute distress.  Head/Face: Atraumatic.  No Wells sign or raccoon eyes.  Neck: No Midline cervical tenderness, step-offs or deformities.  Full range of motion.    Back: No midline thoracic, " lumbar or sacral spine tenderness, step-offs or deformities.    Chest: No chest wall tenderness.  Breath sounds are equal bilaterally.  No wheezes.  No rhonchi.  No rales.  Cardiovascular: Regular rate and rhythm.  No murmurs.  No gallops.  No rubs.  Abdomen: Soft. Nontender.   No distention.  No guarding. No rebound.  No ecchymoses. Non-peritoneal.  Musculoskeletal: Good range of motion of other joints.  No bony tenderness in the extremities.  No deformities.  No soft tissue tenderness.   Integument: No ecchymoses or bleeding. [place] with lower still attached imbedded in the superior lateral aspect of his right cheek, below his orbit.  The barbed end of the fishing hook is not visible.  It is a single hook fishhook.  No underlying swelling or erythema.  Neurologic: Motor intact.  Sensation intact.  Cranial nerves II through XII intact.  Cerebellar exam intact. Neurovascularly intact  Mental status: Alert and oriented x 3.  GCS 15.    MDM:    Initial impression  Urgent evaluation of a 47 y.o. male with fishhook imbedded into his cheek. Patient is afebrile, nontoxic appearing and hemodynamically stable.  Puncture wound is not currently bleeding.  Plan for local anesthesia and removal of hook with wire cutters and pull through method.  Will discharge with antibiotics to cover for vibrio other aquatic bacteria.  Will administer Toradol for pain and Tdap vaccine.    Differential Diagnosis includes, but is not limited to:  Open fracture, vascular injury, tendon/ligament injury, nerve injury, retained foreign body, superficial laceration, abrasion.    ED management  Plan to discharge with doxycycline to cover for vibrio and other aquatic species that could cause infection.  Patient and his wife at bedside educated on return precautions and other concerns for which to return to the ED. Patient with notable hypertension and admitted noncompliance of medication.  Stressed the importance of adhering to his medication  regimen and following up with his primary care of his questions.  After taking into careful account the patient's history, physical exam findings, as well as empirical and objective data obtained throughout ED workup, I feel no emergent medical condition has been identified. No further evaluation or admission was felt to be required, and the patient is stable for discharge from the ED. The patient was updated with test results, overall clinical impression, and recommended further plan of care, including discharge instructions as provided and outpatient follow-up for continued evaluation and management as needed. All questions were answered. The patient expressed understanding and agreed with current plan for discharge and follow-up plan of care. Strict ED return precautions were provided, including return/worsening of current symptoms, new symptoms, or any other concerns.      Foreign Body    Date/Time: 9/18/2022 5:28 PM  Performed by: Gabriela Covington PA-C  Authorized by: Andre Johnson MD   Consent Done: Yes  Consent: Verbal consent obtained.  Consent given by: patient  Intake: Face, right cheek.  Anesthesia: local infiltration    Anesthesia:  Local Anesthetic: lidocaine 1% without epinephrine  Anesthetic total: 5 mL    Patient sedated: no  Patient cooperative: yes  Complexity: simple  1 objects recovered.  Objects recovered: Single fishhook  Post-procedure assessment: foreign body removed  Patient tolerance: Patient tolerated the procedure well with no immediate complications  Comments: Used the push through method.  After anesthetizing the area, pushed the jay and of the hook through the skin of the cheek and used wire cutters to remove the end with the lower attached to it so that I could pull the hook all the way through.  Minimal bleeding.  Covered puncture wounds with Steri-Strips.          Diagnostic Impression:    1. Fish hook injury of cheek, initial encounter    2. Foreign body (FB) in soft  tissue    3. Puncture wound         ED Disposition Condition    Discharge Stable          Medications   ketorolac injection 30 mg (30 mg Intramuscular Given 9/18/22 1546)   LIDOcaine HCL 10 mg/ml (1%) injection 5 mL (5 mLs Infiltration Given 9/18/22 1548)   Tdap vaccine injection 0.5 mL (0.5 mLs Intramuscular Given 9/18/22 1547)         ED Prescriptions       Medication Sig Dispense Start Date End Date Auth. Provider    doxycycline (VIBRAMYCIN) 100 MG Cap Take 1 capsule (100 mg total) by mouth 2 (two) times daily. for 7 days 14 capsule 9/18/2022 9/25/2022 Gabriela Covington PA-C          Follow-up Information       Follow up With Specialties Details Why Contact Info    Marquise Nguyen MD Internal Medicine  For wound re-check [address] [facility]  [address]  [facility] [address] [phone]      [facility] Emergency Dept Emergency Medicine  If symptoms worsen [address] [facility] [id number] [phone]             Gabriela Covington PA-C  09/18/22 3050

## 2022-09-18 NOTE — DISCHARGE INSTRUCTIONS
Start taking antibiotics as prescribed, and continue taking medication until the entire prescription has been completed.  Avoid using drugs/alcohol while on antibiotics, and for the next 72 hours after finishing the prescription.  Obtain an appointment or return to the ED for any symptoms of worsening infection, including fever, nausea/vomiting or inability to take the medication, antibiotic side effects, allergic reaction, or any other concerns.

## 2022-10-31 ENCOUNTER — LAB VISIT (OUTPATIENT)
Dept: LAB | Facility: HOSPITAL | Age: 47
End: 2022-10-31
Attending: INTERNAL MEDICINE
Payer: COMMERCIAL

## 2022-10-31 DIAGNOSIS — I10 HYPERTENSION, UNSPECIFIED TYPE: ICD-10-CM

## 2022-10-31 DIAGNOSIS — Z12.5 PROSTATE CANCER SCREENING: ICD-10-CM

## 2022-10-31 DIAGNOSIS — E78.5 HYPERLIPIDEMIA, UNSPECIFIED HYPERLIPIDEMIA TYPE: ICD-10-CM

## 2022-10-31 LAB
ALBUMIN SERPL BCP-MCNC: 4.5 G/DL (ref 3.5–5.2)
ALP SERPL-CCNC: 109 U/L (ref 55–135)
ALT SERPL W/O P-5'-P-CCNC: 58 U/L (ref 10–44)
ANION GAP SERPL CALC-SCNC: 13 MMOL/L (ref 8–16)
AST SERPL-CCNC: 27 U/L (ref 10–40)
BASOPHILS # BLD AUTO: 0.03 K/UL (ref 0–0.2)
BASOPHILS NFR BLD: 0.4 % (ref 0–1.9)
BILIRUB SERPL-MCNC: 0.9 MG/DL (ref 0.1–1)
BUN SERPL-MCNC: 12 MG/DL (ref 6–20)
CALCIUM SERPL-MCNC: 9.9 MG/DL (ref 8.7–10.5)
CHLORIDE SERPL-SCNC: 104 MMOL/L (ref 95–110)
CHOLEST SERPL-MCNC: 188 MG/DL (ref 120–199)
CHOLEST/HDLC SERPL: 4.2 {RATIO} (ref 2–5)
CO2 SERPL-SCNC: 25 MMOL/L (ref 23–29)
COMPLEXED PSA SERPL-MCNC: 0.54 NG/ML (ref 0–4)
CREAT SERPL-MCNC: 1.1 MG/DL (ref 0.5–1.4)
DIFFERENTIAL METHOD: NORMAL
EOSINOPHIL # BLD AUTO: 0.2 K/UL (ref 0–0.5)
EOSINOPHIL NFR BLD: 2.5 % (ref 0–8)
ERYTHROCYTE [DISTWIDTH] IN BLOOD BY AUTOMATED COUNT: 12.1 % (ref 11.5–14.5)
EST. GFR  (NO RACE VARIABLE): >60 ML/MIN/1.73 M^2
GLUCOSE SERPL-MCNC: 106 MG/DL (ref 70–110)
HCT VFR BLD AUTO: 48 % (ref 40–54)
HDLC SERPL-MCNC: 45 MG/DL (ref 40–75)
HDLC SERPL: 23.9 % (ref 20–50)
HGB BLD-MCNC: 16.7 G/DL (ref 14–18)
IMM GRANULOCYTES # BLD AUTO: 0.03 K/UL (ref 0–0.04)
IMM GRANULOCYTES NFR BLD AUTO: 0.4 % (ref 0–0.5)
LDLC SERPL CALC-MCNC: 117.4 MG/DL (ref 63–159)
LYMPHOCYTES # BLD AUTO: 2.6 K/UL (ref 1–4.8)
LYMPHOCYTES NFR BLD: 33.9 % (ref 18–48)
MCH RBC QN AUTO: 30 PG (ref 27–31)
MCHC RBC AUTO-ENTMCNC: 34.8 G/DL (ref 32–36)
MCV RBC AUTO: 86 FL (ref 82–98)
MONOCYTES # BLD AUTO: 0.4 K/UL (ref 0.3–1)
MONOCYTES NFR BLD: 5.4 % (ref 4–15)
NEUTROPHILS # BLD AUTO: 4.4 K/UL (ref 1.8–7.7)
NEUTROPHILS NFR BLD: 57.4 % (ref 38–73)
NONHDLC SERPL-MCNC: 143 MG/DL
NRBC BLD-RTO: 0 /100 WBC
PLATELET # BLD AUTO: 171 K/UL (ref 150–450)
PMV BLD AUTO: 10 FL (ref 9.2–12.9)
POTASSIUM SERPL-SCNC: 4.6 MMOL/L (ref 3.5–5.1)
PROT SERPL-MCNC: 8 G/DL (ref 6–8.4)
RBC # BLD AUTO: 5.56 M/UL (ref 4.6–6.2)
SODIUM SERPL-SCNC: 142 MMOL/L (ref 136–145)
TRIGL SERPL-MCNC: 128 MG/DL (ref 30–150)
WBC # BLD AUTO: 7.59 K/UL (ref 3.9–12.7)

## 2022-10-31 PROCEDURE — 36415 COLL VENOUS BLD VENIPUNCTURE: CPT | Performed by: INTERNAL MEDICINE

## 2022-10-31 PROCEDURE — 80053 COMPREHEN METABOLIC PANEL: CPT | Performed by: INTERNAL MEDICINE

## 2022-10-31 PROCEDURE — 85025 COMPLETE CBC W/AUTO DIFF WBC: CPT | Performed by: INTERNAL MEDICINE

## 2022-10-31 PROCEDURE — 80061 LIPID PANEL: CPT | Performed by: INTERNAL MEDICINE

## 2022-10-31 PROCEDURE — 84153 ASSAY OF PSA TOTAL: CPT | Performed by: INTERNAL MEDICINE

## 2023-04-25 DIAGNOSIS — G89.29 CHRONIC BILATERAL LOW BACK PAIN WITH BILATERAL SCIATICA: Primary | ICD-10-CM

## 2023-04-25 DIAGNOSIS — M51.36 DDD (DEGENERATIVE DISC DISEASE), LUMBAR: ICD-10-CM

## 2023-04-25 DIAGNOSIS — M48.061 SPINAL STENOSIS OF LUMBAR REGION WITHOUT NEUROGENIC CLAUDICATION: ICD-10-CM

## 2023-04-25 DIAGNOSIS — M54.41 CHRONIC BILATERAL LOW BACK PAIN WITH BILATERAL SCIATICA: Primary | ICD-10-CM

## 2023-04-25 DIAGNOSIS — M54.42 CHRONIC BILATERAL LOW BACK PAIN WITH BILATERAL SCIATICA: Primary | ICD-10-CM

## 2023-05-17 ENCOUNTER — OFFICE VISIT (OUTPATIENT)
Dept: SPORTS MEDICINE | Facility: CLINIC | Age: 48
End: 2023-05-17
Payer: COMMERCIAL

## 2023-05-17 VITALS
DIASTOLIC BLOOD PRESSURE: 88 MMHG | HEIGHT: 74 IN | SYSTOLIC BLOOD PRESSURE: 162 MMHG | WEIGHT: 271 LBS | HEART RATE: 78 BPM | BODY MASS INDEX: 34.78 KG/M2

## 2023-05-17 DIAGNOSIS — M25.552 PAIN OF LEFT HIP: Primary | ICD-10-CM

## 2023-05-17 DIAGNOSIS — M25.852 FEMOROACETABULAR IMPINGEMENT OF LEFT HIP: ICD-10-CM

## 2023-05-17 DIAGNOSIS — M16.12 OSTEOARTHRITIS OF LEFT HIP, UNSPECIFIED OSTEOARTHRITIS TYPE: ICD-10-CM

## 2023-05-17 PROCEDURE — 3077F SYST BP >= 140 MM HG: CPT | Mod: CPTII,S$GLB,, | Performed by: NEUROMUSCULOSKELETAL MEDICINE & OMM

## 2023-05-17 PROCEDURE — 3008F PR BODY MASS INDEX (BMI) DOCUMENTED: ICD-10-PCS | Mod: CPTII,S$GLB,, | Performed by: NEUROMUSCULOSKELETAL MEDICINE & OMM

## 2023-05-17 PROCEDURE — 4010F PR ACE/ARB THEARPY RXD/TAKEN: ICD-10-PCS | Mod: CPTII,S$GLB,, | Performed by: NEUROMUSCULOSKELETAL MEDICINE & OMM

## 2023-05-17 PROCEDURE — 99999 PR PBB SHADOW E&M-EST. PATIENT-LVL III: ICD-10-PCS | Mod: PBBFAC,,, | Performed by: NEUROMUSCULOSKELETAL MEDICINE & OMM

## 2023-05-17 PROCEDURE — 1160F RVW MEDS BY RX/DR IN RCRD: CPT | Mod: CPTII,S$GLB,, | Performed by: NEUROMUSCULOSKELETAL MEDICINE & OMM

## 2023-05-17 PROCEDURE — 4010F ACE/ARB THERAPY RXD/TAKEN: CPT | Mod: CPTII,S$GLB,, | Performed by: NEUROMUSCULOSKELETAL MEDICINE & OMM

## 2023-05-17 PROCEDURE — 1160F PR REVIEW ALL MEDS BY PRESCRIBER/CLIN PHARMACIST DOCUMENTED: ICD-10-PCS | Mod: CPTII,S$GLB,, | Performed by: NEUROMUSCULOSKELETAL MEDICINE & OMM

## 2023-05-17 PROCEDURE — 99214 PR OFFICE/OUTPT VISIT, EST, LEVL IV, 30-39 MIN: ICD-10-PCS | Mod: S$GLB,,, | Performed by: NEUROMUSCULOSKELETAL MEDICINE & OMM

## 2023-05-17 PROCEDURE — 3077F PR MOST RECENT SYSTOLIC BLOOD PRESSURE >= 140 MM HG: ICD-10-PCS | Mod: CPTII,S$GLB,, | Performed by: NEUROMUSCULOSKELETAL MEDICINE & OMM

## 2023-05-17 PROCEDURE — 1159F PR MEDICATION LIST DOCUMENTED IN MEDICAL RECORD: ICD-10-PCS | Mod: CPTII,S$GLB,, | Performed by: NEUROMUSCULOSKELETAL MEDICINE & OMM

## 2023-05-17 PROCEDURE — 99214 OFFICE O/P EST MOD 30 MIN: CPT | Mod: S$GLB,,, | Performed by: NEUROMUSCULOSKELETAL MEDICINE & OMM

## 2023-05-17 PROCEDURE — 3079F PR MOST RECENT DIASTOLIC BLOOD PRESSURE 80-89 MM HG: ICD-10-PCS | Mod: CPTII,S$GLB,, | Performed by: NEUROMUSCULOSKELETAL MEDICINE & OMM

## 2023-05-17 PROCEDURE — 3008F BODY MASS INDEX DOCD: CPT | Mod: CPTII,S$GLB,, | Performed by: NEUROMUSCULOSKELETAL MEDICINE & OMM

## 2023-05-17 PROCEDURE — 1159F MED LIST DOCD IN RCRD: CPT | Mod: CPTII,S$GLB,, | Performed by: NEUROMUSCULOSKELETAL MEDICINE & OMM

## 2023-05-17 PROCEDURE — 3079F DIAST BP 80-89 MM HG: CPT | Mod: CPTII,S$GLB,, | Performed by: NEUROMUSCULOSKELETAL MEDICINE & OMM

## 2023-05-17 PROCEDURE — 99999 PR PBB SHADOW E&M-EST. PATIENT-LVL III: CPT | Mod: PBBFAC,,, | Performed by: NEUROMUSCULOSKELETAL MEDICINE & OMM

## 2023-05-17 NOTE — PROGRESS NOTES
Subjective:     Jean Martinez    Chief Complaint   Patient presents with    Follow-up         HPI      Jean is a 48 y.o. male coming in today for left hip pain. Since last visit the pain has Improved then deteriorate 3 weeks ago after moving things at home. He reports his hip is currently worse than his low back. He has trouble sleeping due to hip pain. He localizes the pain to his groin and lateral hip. The pain is better with rest, PT and worse with activity, walking, sleeping, lying down.  Pt. describes the pain as a 1/10 currently 7-8/10 at worst achy pain that does not radiate. There has not been any new a fall/injury/ or traumas since last visit. Pt. denies any new musculoskeletal complaints at this time. Pt had shoulder surgery 11/2022 with Dr. Eboni SEN repair, biceps tenodesis and rotator cuff debridement. He tore the long head of his biceps tendon postoperatively.     Office note from 9/1/22 reviewed    PAST MEDICAL HISTORY:   Past Medical History:   Diagnosis Date    Hypertension      PAST SURGICAL HISTORY:   Past Surgical History:   Procedure Laterality Date    KNEE SURGERY      SHOULDER SURGERY       FAMILY HISTORY:   Family History   Problem Relation Age of Onset    No Known Problems Mother     No Known Problems Father      SOCIAL HISTORY:   Social History     Socioeconomic History    Marital status:    Tobacco Use    Smoking status: Never    Smokeless tobacco: Never       MEDICATIONS:   Current Outpatient Medications:     amLODIPine (NORVASC) 5 MG tablet, Take 1 tablet (5 mg total) by mouth once daily., Disp: 30 tablet, Rfl: 0    valsartan (DIOVAN) 320 MG tablet, Take 1 tablet (320 mg total) by mouth once daily., Disp: 30 tablet, Rfl: 0    cyclobenzaprine (FLEXERIL) 10 MG tablet, Take 1 tablet (10 mg total) by mouth 3 (three) times daily as needed for Muscle spasms. (Patient not taking: Reported on 5/17/2023), Disp: 60 tablet, Rfl: 1    gabapentin (NEURONTIN) 300 MG capsule, Take 1 capsule  "(300 mg total) by mouth 3 (three) times daily. (Patient not taking: Reported on 5/17/2023), Disp: 60 capsule, Rfl: 1    meloxicam (MOBIC) 15 MG tablet, Take 1 tablet (15 mg total) by mouth once daily. (Patient not taking: Reported on 5/17/2023), Disp: 60 tablet, Rfl: 1  ALLERGIES: Review of patient's allergies indicates:  No Known Allergies    Objective:     VITAL SIGNS: BP (!) 162/88   Pulse 78   Ht 6' 2" (1.88 m)   Wt 122.9 kg (271 lb)   BMI 34.79 kg/m²     General    Vitals reviewed.  Constitutional: He is oriented to person, place, and time. He appears well-developed and well-nourished.   Neurological: He is alert and oriented to person, place, and time.   Psychiatric: He has a normal mood and affect. His behavior is normal.               MUSCULOSKELETAL EXAM:     HIP: left HIP  The affected hip is compared to the contralateral hip.    Observation:    There is no edema, erythema, or ecchymosis in the lumbosacral region.   There is no Trendelenburg sign on either side  No obvious pelvic obliquity while standing.    No thoracolumbar scoliosis observed.    No midline skin abnormalities.  No atrophy noted in the lower limbs.  Gait: Left antalgic with Neutral ankle mechanics and Neutral medial arch    ROM (* = with pain):  Passive hip flexion to 120° on left and 120° on right  Passive hip internal rotation to 35° on left* and 45° on right  Passive hip external rotation to 45° on left and 45° on right   Passive hip abduction to 45° on left and 45° on right    Tenderness To Palpation:  No tenderness at the ASIS, AIIS, PSIS, PIIS, iliac crest, pubic bones, ischial tuberosity.  No tenderness throughout the lumbar spine, iliolumbar region, or posterior pelvis.  No tenderness throughout the sacrum or piriformis  No tenderness over the greater trochanteric bursa or greater/lesser trochanters.  No tenderness at the glut attachments on the greater trochanter  No tenderness over proximal IT band or hip flexor " musculature.    Strength Testing (* = with pain):  Hip flexion - 5/5 on left and 5/5 on right  Hip extension - 5/5 on left and 5/5 on right  Hip adduction - 5/5 on left and 5/5 on right  Hip abduction - 5/5 on left and 5/5 on right  Knee flexion - 5/5 on left and 5/5 on right  Knee extension - 5/5 on left and 5/5 on right    Special Tests:  Standing Trendelenburg test - negative    Seated straight leg raise - negative  Supine straight leg raise - negative  Hamstring flexibility symmetric    Log roll - negative  DIDI -positive  FADIR - positive  Scour test - negative  No pain with posterior hip capsule compression    ASIS compression test - negative  SI drawer test - negative   Thigh thrust test - negative     Piriformis test (Bonnet's) - negative  Ely's test - negative  Quadriceps flexibility symmetric.  Charlie compression test - negative    Fulcrum test - negative    Neurovascular Exam:  2+ femoral, DP, and PT pulses BL.  No skin changes, no abnormal hair distribution.  Sensation intact to light touch throughout the obturator and medial/lateral/posterior femoral cutaneous nerves.  2+/4 reflexes at L4 and S1 dermatomes  Capillary refill intact to <2 seconds in all lower limb digits.      Assessment:      Encounter Diagnoses   Name Primary?    Pain of left hip Yes    Osteoarthritis of left hip, unspecified osteoarthritis type     Femoroacetabular impingement of left hip               Plan:      1. Deteriorated left hip pain likely secondary to mild DJD and impingement changes on today's x-ray. However, given unilateral DJD changes and early onset of OA, left hip MRI ordered to rule out AVN as well.   - Discussed option of US guided left  iahip CSI as next step with AVN ruled out with MRI  - recommend meloxicam 15 mg p.o. q.day as needed for pain control as well as ice of 20 minutes at time  -  X-ray images of left hip taken 9/1/22 (AP pelvis and frogleg lateral  left views) showed mild DJD and impingement change of  the left hip.  No fracture dislocation bone destruction seen. Images were personally reviewed with patient.    2. Follow-up in 1 week for reevaluation and review of left hip MRI results    3. Patient agreeable to today's plan and all questions were answered    This note is dictated using the M*Modal Fluency Direct word recognition program. There are word recognition mistakes that are occasionally missed on review.

## 2024-04-22 ENCOUNTER — OFFICE VISIT (OUTPATIENT)
Dept: URGENT CARE | Facility: CLINIC | Age: 49
End: 2024-04-22
Payer: COMMERCIAL

## 2024-04-22 VITALS
SYSTOLIC BLOOD PRESSURE: 138 MMHG | WEIGHT: 268 LBS | HEIGHT: 74 IN | HEART RATE: 89 BPM | OXYGEN SATURATION: 97 % | DIASTOLIC BLOOD PRESSURE: 74 MMHG | BODY MASS INDEX: 34.39 KG/M2 | RESPIRATION RATE: 17 BRPM | TEMPERATURE: 98 F

## 2024-04-22 DIAGNOSIS — R10.31 RIGHT LOWER QUADRANT ABDOMINAL PAIN: Primary | ICD-10-CM

## 2024-04-22 DIAGNOSIS — R10.9 FLANK PAIN: ICD-10-CM

## 2024-04-22 LAB
BILIRUB UR QL STRIP: NEGATIVE
GLUCOSE UR QL STRIP: NEGATIVE
KETONES UR QL STRIP: NEGATIVE
LEUKOCYTE ESTERASE UR QL STRIP: NEGATIVE
PH, POC UA: 6 (ref 5–8)
POC BLOOD, URINE: NEGATIVE
POC NITRATES, URINE: NEGATIVE
PROT UR QL STRIP: NEGATIVE
SP GR UR STRIP: 1.02 (ref 1–1.03)
UROBILINOGEN UR STRIP-ACNC: NORMAL (ref 0.3–2.2)

## 2024-04-22 PROCEDURE — 74018 RADEX ABDOMEN 1 VIEW: CPT | Mod: FY,S$GLB,, | Performed by: RADIOLOGY

## 2024-04-22 PROCEDURE — 96372 THER/PROPH/DIAG INJ SC/IM: CPT | Mod: S$GLB,,,

## 2024-04-22 PROCEDURE — 81003 URINALYSIS AUTO W/O SCOPE: CPT | Mod: QW,S$GLB,,

## 2024-04-22 PROCEDURE — 99214 OFFICE O/P EST MOD 30 MIN: CPT | Mod: 25,S$GLB,,

## 2024-04-22 RX ORDER — KETOROLAC TROMETHAMINE 30 MG/ML
30 INJECTION, SOLUTION INTRAMUSCULAR; INTRAVENOUS
Status: COMPLETED | OUTPATIENT
Start: 2024-04-22 | End: 2024-04-22

## 2024-04-22 RX ADMIN — KETOROLAC TROMETHAMINE 30 MG: 30 INJECTION, SOLUTION INTRAMUSCULAR; INTRAVENOUS at 03:04

## 2024-04-22 NOTE — PROGRESS NOTES
"Subjective:      Patient ID: Jean Martinez is a 49 y.o. male.    Vitals:  height is 6' 2" (1.88 m) and weight is 121.6 kg (268 lb). His oral temperature is 97.6 °F (36.4 °C). His blood pressure is 138/74 and his pulse is 89. His respiration is 17 and oxygen saturation is 97%.     Chief Complaint: Flank Pain    This is a 49 y.o. male who presents today with a chief complaint of flank pain radiating to groin as well as urinary frequency. Sx started Wednesday and patient is not taking any medications for pain.  Pain is intermittent  5/10 achy  with occasional stabbing pain  located on lower right abdomen. He states he has been  drinking plenty of water. Denies any previous occurrence. Denies any urinary urgency, dysuria, hematuria, penile discharge, penile odor, penile pain, pelvic pain, back pain or abdominal pain.  Denies hx of recurrent UTIs or kidney stones. Denies numbness or tingling. Denies radiation of pain. Denies fever, chills, body aches, chest pain, shortness of breath, abdominal pain, nausea, vomiting, diarrhea, or rashes.      Flank Pain  This is a new problem. The current episode started in the past 7 days. The problem occurs constantly. The problem is unchanged. The quality of the pain is described as aching. The pain does not radiate. The pain is at a severity of 5/10. The pain is moderate. The pain is The same all the time. The symptoms are aggravated by bending, position and twisting. Stiffness is present All day. Pertinent negatives include no abdominal pain, bladder incontinence, bowel incontinence, chest pain, dysuria, fever, headaches, leg pain, pelvic pain, perianal numbness or tingling.       Constitution: Negative for activity change, appetite change, chills, sweating, fatigue, fever and generalized weakness.   HENT:  Negative for ear pain, ear discharge, foreign body in ear, tinnitus, hearing loss, facial swelling, congestion, nosebleeds, foreign body in nose, postnasal drip, sinus pain, sinus " pressure, sore throat, trouble swallowing and voice change.    Neck: Negative for neck pain, neck stiffness and neck swelling.   Cardiovascular:  Negative for chest pain, leg swelling, palpitations and sob on exertion.   Eyes:  Negative for eye discharge, eye itching, eye pain and eye redness.   Respiratory:  Negative for chest tightness, cough, sputum production, shortness of breath, wheezing and asthma.    Gastrointestinal:  Negative for abdominal pain, nausea, vomiting, constipation, diarrhea and bowel incontinence.   Genitourinary:  Positive for frequency and flank pain. Negative for dysuria, urgency, urine decreased, bladder incontinence, bed wetting, hematuria, history of kidney stones, genital trauma, genital sore, penile discharge, painful ejaculation, penile pain, penile swelling, scrotal swelling, testicular pain and pelvic pain.   Musculoskeletal:  Negative for pain, pain with walking and muscle ache.   Skin:  Negative for rash, erythema and bruising.   Allergic/Immunologic: Negative for environmental allergies, seasonal allergies, food allergies, asthma, chronic cough, sneezing and flu shot.   Neurological:  Negative for dizziness, light-headedness, passing out, coordination disturbances, loss of balance, headaches, disorientation and altered mental status.   Psychiatric/Behavioral:  Negative for altered mental status, disorientation, confusion, agitation and nervous/anxious. The patient is not nervous/anxious.       Objective:     Physical Exam   Constitutional: He is oriented to person, place, and time. He appears well-developed.  Non-toxic appearance. He does not appear ill. No distress. normal  HENT:   Head: Normocephalic and atraumatic.   Ears:   Right Ear: External ear normal.   Left Ear: External ear normal.   Nose: Nose normal.   Mouth/Throat: Mucous membranes are normal.   Eyes: Conjunctivae and lids are normal.   Neck: Trachea normal. Neck supple.   Cardiovascular: Normal rate, regular rhythm  and normal heart sounds.   Pulmonary/Chest: Effort normal and breath sounds normal. No stridor. No respiratory distress. He has no decreased breath sounds. He has no wheezes. He has no rhonchi. He has no rales.   Abdominal: Normal appearance and bowel sounds are normal. He exhibits no distension and no mass. Soft. There is abdominal tenderness in the right lower quadrant. There is no rebound, no guarding, no tenderness at McBurney's point, negative Lua's sign, no left CVA tenderness, negative Rovsing's sign and no right CVA tenderness. No hernia.   Musculoskeletal: Normal range of motion.         General: Normal range of motion.      Comments: Graded 5/5 right hip flexion/extension with no pain or referred pain. Normal sensation bilaterally.    Neurological: He is alert and oriented to person, place, and time. He has normal strength.   Skin: Skin is warm, dry, intact, not diaphoretic and not pale. No erythema   Psychiatric: His speech is normal and behavior is normal. Judgment and thought content normal.   Nursing note and vitals reviewed.      Assessment:     1. Right lower quadrant abdominal pain    2. Flank pain      Results for orders placed or performed in visit on 04/22/24   POCT Urinalysis, Dipstick, Automated, W/O Scope   Result Value Ref Range    POC Blood, Urine Negative Negative    POC Bilirubin, Urine Negative Negative    POC Urobilinogen, Urine Norm 0.3 - 2.2    POC Ketones, Urine Negative Negative    POC Protein, Urine Negative Negative    POC Nitrates, Urine Negative Negative    POC Glucose, Urine Negative Negative    pH, UA 6.0 5 - 8    POC Specific Gravity, Urine 1.020 1.003 - 1.029    POC Leukocytes, Urine Negative Negative     XR KUB    Result Date: 4/22/2024  EXAMINATION: XR KUB CLINICAL HISTORY: Unspecified abdominal pain TECHNIQUE: Single AP supine view of the abdomen (KUB) was performed COMPARISON: None FINDINGS: Nonspecific bowel gas pattern.  Mild stool retention.  No organomegaly.  No  abnormal calcifications.  No bowel distension.  The osseous structures appear normal.     No acute process seen. Electronically signed by: Jennifer Gamble MD Date:    04/22/2024 Time:    15:13       Plan:       Right lower quadrant abdominal pain  -     US Abdomen Limited; Future; Expected date: 04/22/2024  -     XR KUB; Future; Expected date: 04/22/2024  -     ketorolac injection 30 mg  -     Ambulatory referral/consult to Gastroenterology    Flank pain  -     POCT Urinalysis, Dipstick, Automated, W/O Scope  -     Cancel: XR ABDOMEN FLAT AND ERECT; Future; Expected date: 04/22/2024  -     XR KUB; Future; Expected date: 04/22/2024  -     ketorolac injection 30 mg      Ultrasound ordered for lower abdomen scheduled tomorrow morning in [place].  Hip exam was normal despite mild OA in bilateral hip joints noted on KUB.  Patient also states he is able to workout daily with no in or referred pain in his hips or pain.  Patient have mild stool retention but I also explained to him that that pain is not going to come and go in the same spot for a week especially if he is passing stool like minimal daily.  Ultrasound ordered to rule out possible hernia even though none was identified on exam. Patient does lift heavy amounts of weights daily at the gym. Toradol injection was given in clinic today for pain relief.  Patient was clinically stable before leaving clinic. We had shared decision making for patient's treatment. We discussed side effects/alternatives/benefits/risk and patient would like to proceed with treatment plan. We also discussed other OTC treatment recommendations. Patient was counseled, explained with the test results meaning, expected course, and answered all of questions. Patient received an injection of a powerful NSAID today (Toradol).  It's effects will last up to 24 hours. Please do not take another NSAID (ie aspirin, ibuprofen, Aleve, Advil or Motrin) until this time tomorrow.  If you continue to  have pain, you may take Tylenol (acetaminophen) if you are not allergic to this medication. Continue to drink plenty of fluids. Follow up with PCP in the next 1-2 weeks as needed.  Gave patient strict ER/urgent care precautions in case symptoms worsen or if any new concerns arise.

## 2024-04-22 NOTE — PATIENT INSTRUCTIONS
- Rest.    - Drink plenty of fluids.    - Acetaminophen (tylenol) or Ibuprofen (advil,motrin) as directed as needed for fever/pain. Avoid tylenol if you have a history of liver disease. Do not take ibuprofen if you have a history of GI bleeding, kidney disease, or if you take blood thinners.   - You received an injection of a powerful NSAID today (Toradol).  It's effects will last up to 24 hours. Please do not take another NSAID (ie aspirin, ibuprofen, Aleve, Advil or Motrin) until this time tomorrow.  If you continue to have pain, you may take Tylenol (acetaminophen) if you are not allergic to this medication.  - Follow up with your PCP or specialty clinic as directed in the next 1-2 weeks if not improved or as needed.  You can call [phone] to schedule an appointment with the appropriate provider.    - Go to the ER or seek medical attention immediately if you develop new or worsening symptoms.     - You must understand that you have received an Urgent Care treatment only and that you may be released before all of your medical problems are known or treated.   - You, the patient, will arrange for follow up care as instructed.   - If your condition worsens or fails to improve we recommend that you receive another evaluation at the ER immediately or contact your PCP to discuss your concerns or return here.

## 2024-04-27 ENCOUNTER — TELEPHONE (OUTPATIENT)
Dept: URGENT CARE | Facility: CLINIC | Age: 49
End: 2024-04-27
Payer: COMMERCIAL

## 2024-05-06 PROBLEM — K21.9 GASTROESOPHAGEAL REFLUX DISEASE: Status: ACTIVE | Noted: 2024-05-06

## 2024-05-06 PROBLEM — N52.9 ERECTILE DYSFUNCTION: Status: ACTIVE | Noted: 2024-05-06

## 2024-07-22 NOTE — PROGRESS NOTES
Subjective:       Patient ID: Jean Martinez is a 49 y.o. male.    Chief Complaint: Follow-up (Vas consult)     This is a 49 y.o.  male patient that is new to me.  The patient was referred to me by Dr. Nguyen for  vasectomy evaluation.    Patient is , currently in stable 1 year relationship.  He has 2 children, youngest aged 10 yo with previous wife.  He denies scrotal discomfort or history of scrotal trauma or surgery.  He denies illicit drug use.  He is not on blood thinners.      ---  Past Medical History:   Diagnosis Date    Hypertension        Past Surgical History:   Procedure Laterality Date    KNEE SURGERY      SHOULDER SURGERY         Family History   Problem Relation Name Age of Onset    No Known Problems Mother      No Known Problems Father         Social History     Tobacco Use    Smoking status: Never    Smokeless tobacco: Never       Current Outpatient Medications on File Prior to Visit   Medication Sig Dispense Refill    amLODIPine (NORVASC) 5 MG tablet Take 1 tablet (5 mg total) by mouth once daily. 90 tablet 3    olmesartan-hydrochlorothiazide (BENICAR HCT) 40-12.5 mg Tab Take 1 tablet by mouth once daily. 90 tablet 3    omeprazole (PRILOSEC) 20 MG capsule Take 1 capsule (20 mg total) by mouth once daily. 30 capsule 3    sildenafiL (VIAGRA) 100 MG tablet Take 1 pill 30 minutes prior to sex, as needed 20 tablet 4     No current facility-administered medications on file prior to visit.       Review of patient's allergies indicates:  No Known Allergies    Review of Systems   Constitutional:  Negative for activity change, chills and fever.   HENT:  Negative for congestion.    Respiratory:  Negative for cough, chest tightness and shortness of breath.    Cardiovascular:  Negative for chest pain and palpitations.   Gastrointestinal:  Negative for abdominal distention, abdominal pain, nausea and vomiting.   Genitourinary:  Negative for difficulty urinating, flank pain, hematuria, penile pain, scrotal  swelling and testicular pain.   Musculoskeletal:  Negative for gait problem.       Objective:      Physical Exam  HENT:      Head: Atraumatic.   Pulmonary:      Effort: Pulmonary effort is normal.   Genitourinary:     Comments: Retracted scrotum, able to palpate vas  Neurological:      General: No focal deficit present.      Mental Status: He is alert and oriented to person, place, and time.         Assessment:     No problems updated.      Plan:     Schedule vasectomy    Discussion:  Patient was extensively counseled on the risks, benefits and alternatives of vasectomy.  All questions were answered.  I specifically discussed that vasectomy is meant to be a permanent form of contraception. Following vasectomy, another form of contraception is required until vas occlusion is confirmed by post-vasectomy semen analysis.   The risk of pregnancy post-vasectomy is approximately 1 in 2,000 for men for have post-vasectomy azoospermia.  Repeat vasectomy is necessary in <1% of vasectomies. There is a 1-2% risk of complications such as symptomatic hematoma and infection.  Chronic pain is rare.  After discussion, patient wished to proceed.       Juvenal Kimbrough MD

## 2024-07-23 ENCOUNTER — TELEPHONE (OUTPATIENT)
Dept: UROLOGY | Facility: CLINIC | Age: 49
End: 2024-07-23

## 2024-07-23 ENCOUNTER — OFFICE VISIT (OUTPATIENT)
Dept: UROLOGY | Facility: CLINIC | Age: 49
End: 2024-07-23
Payer: COMMERCIAL

## 2024-07-23 VITALS
SYSTOLIC BLOOD PRESSURE: 138 MMHG | BODY MASS INDEX: 34.02 KG/M2 | HEIGHT: 74 IN | DIASTOLIC BLOOD PRESSURE: 93 MMHG | HEART RATE: 95 BPM

## 2024-07-23 DIAGNOSIS — Z30.09 VASECTOMY EVALUATION: Primary | ICD-10-CM

## 2024-07-23 PROCEDURE — 99999 PR PBB SHADOW E&M-EST. PATIENT-LVL III: CPT | Mod: PBBFAC,,, | Performed by: UROLOGY

## 2024-07-23 PROCEDURE — 1159F MED LIST DOCD IN RCRD: CPT | Mod: CPTII,S$GLB,, | Performed by: UROLOGY

## 2024-07-23 PROCEDURE — 1160F RVW MEDS BY RX/DR IN RCRD: CPT | Mod: CPTII,S$GLB,, | Performed by: UROLOGY

## 2024-07-23 PROCEDURE — 99203 OFFICE O/P NEW LOW 30 MIN: CPT | Mod: S$GLB,,, | Performed by: UROLOGY

## 2024-07-23 PROCEDURE — 3008F BODY MASS INDEX DOCD: CPT | Mod: CPTII,S$GLB,, | Performed by: UROLOGY

## 2024-07-23 PROCEDURE — 3075F SYST BP GE 130 - 139MM HG: CPT | Mod: CPTII,S$GLB,, | Performed by: UROLOGY

## 2024-07-23 PROCEDURE — 3080F DIAST BP >= 90 MM HG: CPT | Mod: CPTII,S$GLB,, | Performed by: UROLOGY

## 2024-07-23 NOTE — Clinical Note
Patient counseled that this is not related to her catheter dye study and that she would not be going through withdrawals as we did not change her medication. Patient verbalized understanding and has no further questions.    Schedule vasectomy, needs 30 minute slot so please block slot after vasectomy

## 2024-07-23 NOTE — TELEPHONE ENCOUNTER
Called to schedule patient Vas. No answer. M with name and call back number. Offered patient surgery date 8/9/24 at 8:30.

## 2024-07-24 ENCOUNTER — TELEPHONE (OUTPATIENT)
Dept: UROLOGY | Facility: CLINIC | Age: 49
End: 2024-07-24
Payer: COMMERCIAL

## 2024-07-24 NOTE — TELEPHONE ENCOUNTER
----- Message from Cha Castaneda sent at 7/24/2024 10:33 AM CDT -----  Type:  Needs Medical Advice    Who Called: Patient  Best Call Back Number: [phone]  Additional Information: Patient is requesting a call back

## 2024-07-25 ENCOUNTER — TELEPHONE (OUTPATIENT)
Dept: UROLOGY | Facility: CLINIC | Age: 49
End: 2024-07-25
Payer: COMMERCIAL

## 2024-07-25 NOTE — TELEPHONE ENCOUNTER
----- Message from Carmel Mcdonnell sent at 7/25/2024 11:49 AM CDT -----  Type:  Call    Who Called:pt   Who Left Message for Patient:requested to speak with Jessica   Does the patient know what this is regarding?:call back  Would the patient rather a call back or a response via [facility]? Call   Best Call Back Number:  [phone]  Additional Information:

## 2024-08-20 ENCOUNTER — OFFICE VISIT (OUTPATIENT)
Dept: URGENT CARE | Facility: CLINIC | Age: 49
End: 2024-08-20
Payer: COMMERCIAL

## 2024-08-20 VITALS
RESPIRATION RATE: 20 BRPM | BODY MASS INDEX: 33.95 KG/M2 | WEIGHT: 264.56 LBS | SYSTOLIC BLOOD PRESSURE: 150 MMHG | TEMPERATURE: 98 F | DIASTOLIC BLOOD PRESSURE: 94 MMHG | HEIGHT: 74 IN | HEART RATE: 76 BPM | OXYGEN SATURATION: 97 %

## 2024-08-20 DIAGNOSIS — I10 HYPERTENSION, UNSPECIFIED TYPE: ICD-10-CM

## 2024-08-20 DIAGNOSIS — M94.0 COSTOCHONDRITIS, ACUTE: Primary | ICD-10-CM

## 2024-08-20 PROCEDURE — 99214 OFFICE O/P EST MOD 30 MIN: CPT | Mod: S$GLB,,, | Performed by: FAMILY MEDICINE

## 2024-08-20 RX ORDER — PREDNISONE 20 MG/1
40 TABLET ORAL DAILY
Qty: 10 TABLET | Refills: 0 | Status: SHIPPED | OUTPATIENT
Start: 2024-08-20 | End: 2024-08-25
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
## 2024-09-14 ENCOUNTER — OFFICE VISIT (OUTPATIENT)
Dept: URGENT CARE | Facility: CLINIC | Age: 49
End: 2024-09-14
Payer: COMMERCIAL

## 2024-09-14 VITALS
WEIGHT: 264 LBS | SYSTOLIC BLOOD PRESSURE: 159 MMHG | TEMPERATURE: 98 F | OXYGEN SATURATION: 97 % | DIASTOLIC BLOOD PRESSURE: 107 MMHG | HEART RATE: 80 BPM | HEIGHT: 74 IN | RESPIRATION RATE: 18 BRPM | BODY MASS INDEX: 33.88 KG/M2

## 2024-09-14 DIAGNOSIS — R05.1 ACUTE COUGH: ICD-10-CM
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
## 2024-09-14 DIAGNOSIS — S23.8XXD SPRAIN OF CHEST WALL, SUBSEQUENT ENCOUNTER: Primary | ICD-10-CM

## 2024-09-14 DIAGNOSIS — M89.8X1 CLAVICLE PAIN: ICD-10-CM

## 2024-09-14 PROCEDURE — 71101 X-RAY EXAM UNILAT RIBS/CHEST: CPT | Mod: FY,LT,S$GLB, | Performed by: RADIOLOGY

## 2024-09-14 PROCEDURE — 99213 OFFICE O/P EST LOW 20 MIN: CPT | Mod: S$GLB,,,

## 2024-09-14 PROCEDURE — 73000 X-RAY EXAM OF COLLAR BONE: CPT | Mod: FY,LT,S$GLB, | Performed by: RADIOLOGY

## 2024-09-14 RX ORDER — BENZONATATE 200 MG/1
200 CAPSULE ORAL 3 TIMES DAILY PRN
Qty: 30 CAPSULE | Refills: 0 | Status: SHIPPED | OUTPATIENT
Start: 2024-09-14 | End: 2024-09-24

## 2024-09-14 RX ORDER — IBUPROFEN 800 MG/1
800 TABLET ORAL EVERY 8 HOURS PRN
Qty: 21 TABLET | Refills: 0 | Status: SHIPPED | OUTPATIENT
Start: 2024-09-14

## 2024-09-14 NOTE — PATIENT INSTRUCTIONS
Please drink plenty of fluids.  Please get plenty of rest.    Please return here or go to the Emergency Department for any concerns or worsening of condition.    Please take IBUPROFEN every 8 hours as needed for pain/inflammation, you may decrease to every 12 hour, or once daily, or discontinue as your symptoms improve.     Please take TESSALON for cough.    Rest, ice, compression and elevation to the affected joint or limb as needed.  Please follow up with your primary care doctor or specialist as needed.    If you  smoke, please stop smoking.
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
## 2024-09-22 ENCOUNTER — TELEPHONE (OUTPATIENT)
Dept: PHARMACY | Facility: CLINIC | Age: 49
End: 2024-09-22
Payer: COMMERCIAL

## 2024-09-22 NOTE — TELEPHONE ENCOUNTER
[facility] Chart Review & Patient Outreach Details For Medication Adherence Project    Reason for Outreach Encounter: 3rd Party payor non-compliance report ([employer], [employer], C, etc)  2.  Patient Outreach Method: [employer] message  3.   Medication in question: olmesartan-hydrochlorothiazide (BENICAR HCT) 40-12.5 mg Tab    LAST FILLED: 5/21/24 for 90 day supply  Hypertension Medications               amLODIPine (NORVASC) 5 MG tablet Take 1 tablet (5 mg total) by mouth once daily.    olmesartan-hydrochlorothiazide (BENICAR HCT) 40-12.5 mg Tab Take 1 tablet by mouth once daily.              4.  Reviewed and or Updates Made To: Patient Chart  5. Outreach Outcomes and/or actions taken: Sent inquiry to patient: Waiting for response.

## 2024-10-04 ENCOUNTER — PROCEDURE VISIT (OUTPATIENT)
Dept: UROLOGY | Facility: CLINIC | Age: 49
End: 2024-10-04
Payer: COMMERCIAL

## 2024-10-04 VITALS
HEIGHT: 74 IN | WEIGHT: 258.69 LBS | SYSTOLIC BLOOD PRESSURE: 154 MMHG | HEART RATE: 110 BPM | BODY MASS INDEX: 33.2 KG/M2 | DIASTOLIC BLOOD PRESSURE: 94 MMHG

## 2024-10-04 DIAGNOSIS — Z30.09 VASECTOMY EVALUATION: ICD-10-CM

## 2024-10-04 DIAGNOSIS — Z30.2 ENCOUNTER FOR VASECTOMY: Primary | ICD-10-CM

## 2024-10-04 RX ORDER — OXYCODONE AND ACETAMINOPHEN 5; 325 MG/1; MG/1
1 TABLET ORAL EVERY 6 HOURS PRN
Qty: 12 TABLET | Refills: 0 | Status: SHIPPED | OUTPATIENT
Start: 2024-10-04 | End: 2024-10-07

## 2024-10-04 NOTE — PROCEDURES
"Vasectomy    Date/Time: 10/4/2024 8:30 AM    Performed by: Juvenal Kimbrough MD  Authorized by: Juvenal Kimbrough MD    Consent Done?:  Yes (Written)  Time out: Immediately prior to procedure a "time out" was called to verify the correct patient, procedure, equipment, support staff and site/side marked as required.    Indications:  [place] male  Anesthesia:  10 cc 2% Lidocaine  Patient sedated: No    Preparation: Patient was prepped and draped in usual sterile fashion    Incisions:  Scalpel-less  Procedure details - Length vas excised: 1 cm.  Vas:  Fulgurated, Tied, Buried and Clipped  Sutures:  3-0 chromic SH  Skin closures:  3-0 chromic SH  Same procedure performed on both sides    Patient tolerance:  Patient tolerated the procedure well with no immediate complications     Procedure in detail:  The risks and benefits of the procedure were explained to the patient and he consented.  He is aware this is elective and there are other forms of birth control.  The genitalia was prepped and draped in a sterile fashion.  The left vas was isolated the skin and vasal sheath was anesthestized.  A no scalpel technique was used to open the skin over the left vas deferens, the vasal sheath was opened, approximately 1 cm of the vas was excised.  The ends were fulgurated, the proximal vas was clipped, no bleeding was noted.  A fascial plane between the two ends was sutured in place with a chromic stitch.  The skin was closed with a chromic stitch.    The above steps were repeated on the patient's right.  The procedure was completed, no dressing was placed.  The patient tolerated the procedure well.     Juvenal Kimbrough MD     "

## 2024-10-08 ENCOUNTER — LAB VISIT (OUTPATIENT)
Dept: LAB | Facility: HOSPITAL | Age: 49
End: 2024-10-08
Attending: INTERNAL MEDICINE
Payer: COMMERCIAL

## 2024-10-08 DIAGNOSIS — I10 HYPERTENSION, UNSPECIFIED TYPE: ICD-10-CM

## 2024-10-08 DIAGNOSIS — Z13.1 SCREENING FOR DIABETES MELLITUS: ICD-10-CM

## 2024-10-08 DIAGNOSIS — Z12.5 PROSTATE CANCER SCREENING: ICD-10-CM

## 2024-10-08 DIAGNOSIS — E78.5 HYPERLIPIDEMIA, UNSPECIFIED HYPERLIPIDEMIA TYPE: ICD-10-CM

## 2024-10-08 DIAGNOSIS — N52.9 ERECTILE DYSFUNCTION, UNSPECIFIED ERECTILE DYSFUNCTION TYPE: ICD-10-CM

## 2024-10-08 DIAGNOSIS — Z00.00 ROUTINE MEDICAL EXAM: ICD-10-CM

## 2024-10-08 LAB
ALBUMIN SERPL BCP-MCNC: 4.3 G/DL (ref 3.5–5.2)
ALP SERPL-CCNC: 98 U/L (ref 55–135)
ALT SERPL W/O P-5'-P-CCNC: 43 U/L (ref 10–44)
ANION GAP SERPL CALC-SCNC: 12 MMOL/L (ref 8–16)
AST SERPL-CCNC: 23 U/L (ref 10–40)
BASOPHILS # BLD AUTO: 0.06 K/UL (ref 0–0.2)
BASOPHILS NFR BLD: 0.8 % (ref 0–1.9)
BILIRUB SERPL-MCNC: 0.6 MG/DL (ref 0.1–1)
BUN SERPL-MCNC: 20 MG/DL (ref 6–20)
CALCIUM SERPL-MCNC: 10 MG/DL (ref 8.7–10.5)
CHLORIDE SERPL-SCNC: 103 MMOL/L (ref 95–110)
CHOLEST SERPL-MCNC: 191 MG/DL (ref 120–199)
CHOLEST/HDLC SERPL: 4 {RATIO} (ref 2–5)
CO2 SERPL-SCNC: 26 MMOL/L (ref 23–29)
COMPLEXED PSA SERPL-MCNC: 0.48 NG/ML (ref 0–4)
CREAT SERPL-MCNC: 0.9 MG/DL (ref 0.5–1.4)
DIFFERENTIAL METHOD BLD: NORMAL
EOSINOPHIL # BLD AUTO: 0.3 K/UL (ref 0–0.5)
EOSINOPHIL NFR BLD: 3.5 % (ref 0–8)
ERYTHROCYTE [DISTWIDTH] IN BLOOD BY AUTOMATED COUNT: 12.2 % (ref 11.5–14.5)
EST. GFR  (NO RACE VARIABLE): >60 ML/MIN/1.73 M^2
ESTIMATED AVG GLUCOSE: 100 MG/DL (ref 68–131)
GLUCOSE SERPL-MCNC: 96 MG/DL (ref 70–110)
HBA1C MFR BLD: 5.1 % (ref 4–5.6)
HCT VFR BLD AUTO: 48.1 % (ref 40–54)
HDLC SERPL-MCNC: 48 MG/DL (ref 40–75)
HDLC SERPL: 25.1 % (ref 20–50)
HGB BLD-MCNC: 16.6 G/DL (ref 14–18)
IMM GRANULOCYTES # BLD AUTO: 0.02 K/UL (ref 0–0.04)
IMM GRANULOCYTES NFR BLD AUTO: 0.3 % (ref 0–0.5)
LDLC SERPL CALC-MCNC: 116 MG/DL (ref 63–159)
LYMPHOCYTES # BLD AUTO: 2.3 K/UL (ref 1–4.8)
LYMPHOCYTES NFR BLD: 29.5 % (ref 18–48)
MCH RBC QN AUTO: 31 PG (ref 27–31)
MCHC RBC AUTO-ENTMCNC: 34.5 G/DL (ref 32–36)
MCV RBC AUTO: 90 FL (ref 82–98)
MONOCYTES # BLD AUTO: 0.5 K/UL (ref 0.3–1)
MONOCYTES NFR BLD: 6.2 % (ref 4–15)
NEUTROPHILS # BLD AUTO: 4.7 K/UL (ref 1.8–7.7)
NEUTROPHILS NFR BLD: 59.7 % (ref 38–73)
NONHDLC SERPL-MCNC: 143 MG/DL
NRBC BLD-RTO: 0 /100 WBC
PLATELET # BLD AUTO: 173 K/UL (ref 150–450)
PMV BLD AUTO: 10.3 FL (ref 9.2–12.9)
POTASSIUM SERPL-SCNC: 4 MMOL/L (ref 3.5–5.1)
PROT SERPL-MCNC: 7.8 G/DL (ref 6–8.4)
RBC # BLD AUTO: 5.36 M/UL (ref 4.6–6.2)
SODIUM SERPL-SCNC: 141 MMOL/L (ref 136–145)
TRIGL SERPL-MCNC: 135 MG/DL (ref 30–150)
WBC # BLD AUTO: 7.77 K/UL (ref 3.9–12.7)

## 2024-10-08 PROCEDURE — 84153 ASSAY OF PSA TOTAL: CPT | Performed by: INTERNAL MEDICINE

## 2024-10-08 PROCEDURE — 85025 COMPLETE CBC W/AUTO DIFF WBC: CPT | Performed by: INTERNAL MEDICINE

## 2024-10-08 PROCEDURE — 80061 LIPID PANEL: CPT | Performed by: INTERNAL MEDICINE

## 2024-10-08 PROCEDURE — 80053 COMPREHEN METABOLIC PANEL: CPT | Performed by: INTERNAL MEDICINE

## 2024-10-08 PROCEDURE — 84403 ASSAY OF TOTAL TESTOSTERONE: CPT | Performed by: INTERNAL MEDICINE

## 2024-10-08 PROCEDURE — 83036 HEMOGLOBIN GLYCOSYLATED A1C: CPT | Performed by: INTERNAL MEDICINE

## 2024-10-09 LAB — TESTOST SERPL-MCNC: 340 NG/DL (ref 304–1227)

## 2024-10-20 ENCOUNTER — TELEPHONE (OUTPATIENT)
Dept: PHARMACY | Facility: CLINIC | Age: 49
End: 2024-10-20
Payer: COMMERCIAL

## 2024-10-20 NOTE — TELEPHONE ENCOUNTER
[facility] Chart Review & Patient Outreach Details For Medication Adherence Project    Reason for Outreach Encounter: 3rd Party payor non-compliance report ([employer], [employer], C, etc)  2.  Patient Outreach Method: [employer] message  3.   Medication in question: olmesartan-hydrochlorothiazide (BENICAR HCT) 40-12.5 mg Tab    LAST FILLED: 5/21/24 for 90 day supply  Hypertension Medications               amLODIPine (NORVASC) 5 MG tablet Take 1 tablet (5 mg total) by mouth once daily.    olmesartan-hydrochlorothiazide (BENICAR HCT) 40-12.5 mg Tab Take 1 tablet by mouth once daily.              4.  Reviewed and or Updates Made To: Patient Chart  5. Outreach Outcomes and/or actions taken: Sent inquiry to patient: Waiting for response.

## 2024-11-22 ENCOUNTER — TELEPHONE (OUTPATIENT)
Dept: PHARMACY | Facility: CLINIC | Age: 49
End: 2024-11-22
Payer: COMMERCIAL

## 2024-11-22 NOTE — TELEPHONE ENCOUNTER
[facility] Chart Review & Patient Outreach Details For Medication Adherence Project    Reason for Outreach Encounter: 3rd Party payor non-compliance report ([employer], [employer], C, etc)  2.  Patient Outreach Method: [employer] message  3.   Medication in question: olmesartan-hct   LAST FILLED: 5/21/24 for 90 day supply  Hypertension Medications               amLODIPine (NORVASC) 5 MG tablet Take 1 tablet (5 mg total) by mouth once daily.    olmesartan-hydrochlorothiazide (BENICAR HCT) 40-12.5 mg Tab Take 1 tablet by mouth once daily.              4.  Reviewed and or Updates Made To: Patient Chart  5. Outreach Outcomes and/or actions taken: Sent inquiry to patient: Waiting for response.

## 2025-05-16 ENCOUNTER — TELEPHONE (OUTPATIENT)
Dept: PHARMACY | Facility: CLINIC | Age: 50
End: 2025-05-16
Payer: COMMERCIAL

## 2025-05-16 NOTE — TELEPHONE ENCOUNTER
[facility]Population [facility] Chart Review & Patient Outreach Details For Medication Adherence Project    Reason for Outreach Encounter: 3rd Party payor non-compliance report ([employer], [employer], C, etc)  2.  Patient Outreach Method: Reviewed patient chart   3.   Medication in question:    Hypertension Medications              amLODIPine (NORVASC) 5 MG tablet TAKE 1 TABLET(5 MG) BY MOUTH EVERY DAY    olmesartan-hydrochlorothiazide (BENICAR HCT) 40-12.5 mg Tab TAKE 1 TABLET BY MOUTH EVERY DAY                 LF 90 ds 4/22/25    4.  Reviewed and or Updates Made To: Patient Chart  5. Outreach Outcomes and/or actions taken: Patient filled medication and is on track to be adherent  Additional Notes: